# Patient Record
Sex: FEMALE | Race: WHITE | NOT HISPANIC OR LATINO | Employment: STUDENT | URBAN - METROPOLITAN AREA
[De-identification: names, ages, dates, MRNs, and addresses within clinical notes are randomized per-mention and may not be internally consistent; named-entity substitution may affect disease eponyms.]

---

## 2024-08-10 ENCOUNTER — HOSPITAL ENCOUNTER (INPATIENT)
Facility: HOSPITAL | Age: 24
LOS: 1 days | Discharge: HOME | DRG: 392 | End: 2024-08-13
Attending: EMERGENCY MEDICINE | Admitting: SURGERY
Payer: COMMERCIAL

## 2024-08-10 ENCOUNTER — APPOINTMENT (EMERGENCY)
Dept: RADIOLOGY | Facility: HOSPITAL | Age: 24
DRG: 392 | End: 2024-08-10
Payer: COMMERCIAL

## 2024-08-10 DIAGNOSIS — R11.2 NAUSEA AND VOMITING: ICD-10-CM

## 2024-08-10 DIAGNOSIS — K81.9 CHOLECYSTITIS: ICD-10-CM

## 2024-08-10 DIAGNOSIS — R10.9 ABDOMINAL PAIN: Primary | ICD-10-CM

## 2024-08-10 DIAGNOSIS — R74.01 TRANSAMINITIS: ICD-10-CM

## 2024-08-10 LAB
ALBUMIN SERPL BCG-MCNC: 4.6 G/DL (ref 3.5–5)
ALP SERPL-CCNC: 93 U/L (ref 34–104)
ALT SERPL W P-5'-P-CCNC: 237 U/L (ref 7–52)
ANION GAP SERPL CALCULATED.3IONS-SCNC: 9 MMOL/L (ref 4–13)
AST SERPL W P-5'-P-CCNC: 308 U/L (ref 13–39)
B-HCG SERPL-ACNC: <0.6 MIU/ML (ref 0–5)
BASOPHILS # BLD AUTO: 0.02 THOUSANDS/ÂΜL (ref 0–0.1)
BASOPHILS NFR BLD AUTO: 0 % (ref 0–1)
BILIRUB SERPL-MCNC: 0.76 MG/DL (ref 0.2–1)
BILIRUB UR QL STRIP: NEGATIVE
BUN SERPL-MCNC: 22 MG/DL (ref 5–25)
CALCIUM SERPL-MCNC: 9.8 MG/DL (ref 8.4–10.2)
CHLORIDE SERPL-SCNC: 102 MMOL/L (ref 96–108)
CLARITY UR: CLEAR
CO2 SERPL-SCNC: 26 MMOL/L (ref 21–32)
COLOR UR: YELLOW
CREAT SERPL-MCNC: 1.52 MG/DL (ref 0.6–1.3)
EOSINOPHIL # BLD AUTO: 0.07 THOUSAND/ÂΜL (ref 0–0.61)
EOSINOPHIL NFR BLD AUTO: 1 % (ref 0–6)
ERYTHROCYTE [DISTWIDTH] IN BLOOD BY AUTOMATED COUNT: 12.6 % (ref 11.6–15.1)
EXT PREGNANCY TEST URINE: NEGATIVE
EXT. CONTROL: NORMAL
GFR SERPL CREATININE-BSD FRML MDRD: 48 ML/MIN/1.73SQ M
GLUCOSE SERPL-MCNC: 104 MG/DL (ref 65–140)
GLUCOSE UR STRIP-MCNC: NEGATIVE MG/DL
HCT VFR BLD AUTO: 42.9 % (ref 34.8–46.1)
HGB BLD-MCNC: 13.7 G/DL (ref 11.5–15.4)
HGB UR QL STRIP.AUTO: NEGATIVE
IMM GRANULOCYTES # BLD AUTO: 0.05 THOUSAND/UL (ref 0–0.2)
IMM GRANULOCYTES NFR BLD AUTO: 1 % (ref 0–2)
KETONES UR STRIP-MCNC: ABNORMAL MG/DL
LEUKOCYTE ESTERASE UR QL STRIP: NEGATIVE
LIPASE SERPL-CCNC: 28 U/L (ref 11–82)
LYMPHOCYTES # BLD AUTO: 0.81 THOUSANDS/ÂΜL (ref 0.6–4.47)
LYMPHOCYTES NFR BLD AUTO: 9 % (ref 14–44)
MCH RBC QN AUTO: 29.5 PG (ref 26.8–34.3)
MCHC RBC AUTO-ENTMCNC: 31.9 G/DL (ref 31.4–37.4)
MCV RBC AUTO: 92 FL (ref 82–98)
MONOCYTES # BLD AUTO: 0.59 THOUSAND/ÂΜL (ref 0.17–1.22)
MONOCYTES NFR BLD AUTO: 6 % (ref 4–12)
NEUTROPHILS # BLD AUTO: 7.76 THOUSANDS/ÂΜL (ref 1.85–7.62)
NEUTS SEG NFR BLD AUTO: 83 % (ref 43–75)
NITRITE UR QL STRIP: NEGATIVE
NRBC BLD AUTO-RTO: 0 /100 WBCS
PH UR STRIP.AUTO: 5.5 [PH]
PLATELET # BLD AUTO: 238 THOUSANDS/UL (ref 149–390)
PMV BLD AUTO: 9.3 FL (ref 8.9–12.7)
POTASSIUM SERPL-SCNC: 3.8 MMOL/L (ref 3.5–5.3)
PROT SERPL-MCNC: 7.1 G/DL (ref 6.4–8.4)
PROT UR STRIP-MCNC: ABNORMAL MG/DL
RBC # BLD AUTO: 4.65 MILLION/UL (ref 3.81–5.12)
SODIUM SERPL-SCNC: 137 MMOL/L (ref 135–147)
SP GR UR STRIP.AUTO: 1.03 (ref 1–1.03)
UROBILINOGEN UR STRIP-ACNC: <2 MG/DL
WBC # BLD AUTO: 9.3 THOUSAND/UL (ref 4.31–10.16)

## 2024-08-10 PROCEDURE — 99284 EMERGENCY DEPT VISIT MOD MDM: CPT

## 2024-08-10 PROCEDURE — 74177 CT ABD & PELVIS W/CONTRAST: CPT

## 2024-08-10 PROCEDURE — 96375 TX/PRO/DX INJ NEW DRUG ADDON: CPT

## 2024-08-10 PROCEDURE — 99285 EMERGENCY DEPT VISIT HI MDM: CPT | Performed by: EMERGENCY MEDICINE

## 2024-08-10 PROCEDURE — 84702 CHORIONIC GONADOTROPIN TEST: CPT

## 2024-08-10 PROCEDURE — 83690 ASSAY OF LIPASE: CPT

## 2024-08-10 PROCEDURE — 80053 COMPREHEN METABOLIC PANEL: CPT

## 2024-08-10 PROCEDURE — 85652 RBC SED RATE AUTOMATED: CPT | Performed by: SURGERY

## 2024-08-10 PROCEDURE — 85025 COMPLETE CBC W/AUTO DIFF WBC: CPT

## 2024-08-10 PROCEDURE — 81025 URINE PREGNANCY TEST: CPT

## 2024-08-10 PROCEDURE — 36415 COLL VENOUS BLD VENIPUNCTURE: CPT

## 2024-08-10 PROCEDURE — 81001 URINALYSIS AUTO W/SCOPE: CPT

## 2024-08-10 RX ORDER — KETOROLAC TROMETHAMINE 30 MG/ML
15 INJECTION, SOLUTION INTRAMUSCULAR; INTRAVENOUS ONCE
Status: COMPLETED | OUTPATIENT
Start: 2024-08-10 | End: 2024-08-10

## 2024-08-10 RX ORDER — ONDANSETRON 4 MG/1
4 TABLET, ORALLY DISINTEGRATING ORAL ONCE
Status: COMPLETED | OUTPATIENT
Start: 2024-08-10 | End: 2024-08-10

## 2024-08-10 RX ORDER — ACETAMINOPHEN 325 MG/1
650 TABLET ORAL ONCE
Status: COMPLETED | OUTPATIENT
Start: 2024-08-10 | End: 2024-08-10

## 2024-08-10 RX ORDER — SODIUM CHLORIDE, SODIUM GLUCONATE, SODIUM ACETATE, POTASSIUM CHLORIDE, MAGNESIUM CHLORIDE, SODIUM PHOSPHATE, DIBASIC, AND POTASSIUM PHOSPHATE .53; .5; .37; .037; .03; .012; .00082 G/100ML; G/100ML; G/100ML; G/100ML; G/100ML; G/100ML; G/100ML
1000 INJECTION, SOLUTION INTRAVENOUS ONCE
Status: COMPLETED | OUTPATIENT
Start: 2024-08-11 | End: 2024-08-11

## 2024-08-10 RX ADMIN — KETOROLAC TROMETHAMINE 15 MG: 30 INJECTION, SOLUTION INTRAMUSCULAR; INTRAVENOUS at 22:51

## 2024-08-10 RX ADMIN — IOHEXOL 100 ML: 350 INJECTION, SOLUTION INTRAVENOUS at 23:33

## 2024-08-10 RX ADMIN — ONDANSETRON 4 MG: 4 TABLET, ORALLY DISINTEGRATING ORAL at 22:00

## 2024-08-10 RX ADMIN — ACETAMINOPHEN 650 MG: 325 TABLET ORAL at 22:51

## 2024-08-10 NOTE — LETTER
[facility] MED SURG 4  [address]  [place] [address]  Dept: [phone]    August 13, 2024     Patient: Jennifer Henry   YOB: 2000   Date of Visit: 8/10/2024       To Whom it May Concern:    Jennifer Henry is under my professional care. She was seen in the hospital from 8/10/2024 to 08/13/24. She may return to school and full athletic activities on 8/13/24 without limitations.    If you have any questions or concerns, please don't hesitate to call.         Sincerely,          Blanca Nathan MD    [facility] General Surgery [place]  Phone: [phone] [address] [place] [address] [id number]

## 2024-08-11 ENCOUNTER — APPOINTMENT (EMERGENCY)
Dept: RADIOLOGY | Facility: HOSPITAL | Age: 24
DRG: 392 | End: 2024-08-11
Payer: COMMERCIAL

## 2024-08-11 LAB
ALBUMIN SERPL BCG-MCNC: 3.9 G/DL (ref 3.5–5)
ALP SERPL-CCNC: 106 U/L (ref 34–104)
ALT SERPL W P-5'-P-CCNC: 989 U/L (ref 7–52)
ANA SER QL IA: NEGATIVE
ANION GAP SERPL CALCULATED.3IONS-SCNC: 7 MMOL/L (ref 4–13)
AST SERPL W P-5'-P-CCNC: 962 U/L (ref 13–39)
BACTERIA UR QL AUTO: ABNORMAL /HPF
BILIRUB SERPL-MCNC: 1.47 MG/DL (ref 0.2–1)
BUN SERPL-MCNC: 18 MG/DL (ref 5–25)
CALCIUM SERPL-MCNC: 9.2 MG/DL (ref 8.4–10.2)
CHLORIDE SERPL-SCNC: 104 MMOL/L (ref 96–108)
CO2 SERPL-SCNC: 26 MMOL/L (ref 21–32)
CREAT SERPL-MCNC: 1.18 MG/DL (ref 0.6–1.3)
CRP SERPL QL: 9.3 MG/L
ERYTHROCYTE [SEDIMENTATION RATE] IN BLOOD: 4 MM/HOUR (ref 0–19)
GFR SERPL CREATININE-BSD FRML MDRD: 66 ML/MIN/1.73SQ M
GLUCOSE SERPL-MCNC: 92 MG/DL (ref 65–140)
MUCOUS THREADS UR QL AUTO: ABNORMAL
NON-SQ EPI CELLS URNS QL MICRO: ABNORMAL /HPF
POTASSIUM SERPL-SCNC: 4.4 MMOL/L (ref 3.5–5.3)
PROT SERPL-MCNC: 6 G/DL (ref 6.4–8.4)
RBC #/AREA URNS AUTO: ABNORMAL /HPF
SODIUM SERPL-SCNC: 137 MMOL/L (ref 135–147)
WBC #/AREA URNS AUTO: ABNORMAL /HPF

## 2024-08-11 PROCEDURE — 99205 OFFICE O/P NEW HI 60 MIN: CPT | Performed by: SURGERY

## 2024-08-11 PROCEDURE — 86038 ANTINUCLEAR ANTIBODIES: CPT

## 2024-08-11 PROCEDURE — 96372 THER/PROPH/DIAG INJ SC/IM: CPT

## 2024-08-11 PROCEDURE — 86140 C-REACTIVE PROTEIN: CPT

## 2024-08-11 PROCEDURE — 96366 THER/PROPH/DIAG IV INF ADDON: CPT

## 2024-08-11 PROCEDURE — 36415 COLL VENOUS BLD VENIPUNCTURE: CPT

## 2024-08-11 PROCEDURE — 80053 COMPREHEN METABOLIC PANEL: CPT

## 2024-08-11 PROCEDURE — NC001 PR NO CHARGE: Performed by: SURGERY

## 2024-08-11 PROCEDURE — 96365 THER/PROPH/DIAG IV INF INIT: CPT

## 2024-08-11 PROCEDURE — 76705 ECHO EXAM OF ABDOMEN: CPT

## 2024-08-11 RX ORDER — ACETAMINOPHEN 325 MG/1
650 TABLET ORAL EVERY 6 HOURS PRN
Status: DISCONTINUED | OUTPATIENT
Start: 2024-08-11 | End: 2024-08-13 | Stop reason: HOSPADM

## 2024-08-11 RX ORDER — TRAMADOL HYDROCHLORIDE 50 MG/1
50 TABLET ORAL ONCE AS NEEDED
Status: COMPLETED | OUTPATIENT
Start: 2024-08-11 | End: 2024-08-11

## 2024-08-11 RX ORDER — HEPARIN SODIUM 5000 [USP'U]/ML
5000 INJECTION, SOLUTION INTRAVENOUS; SUBCUTANEOUS EVERY 8 HOURS SCHEDULED
Status: DISCONTINUED | OUTPATIENT
Start: 2024-08-11 | End: 2024-08-11

## 2024-08-11 RX ORDER — ONDANSETRON 2 MG/ML
4 INJECTION INTRAMUSCULAR; INTRAVENOUS EVERY 6 HOURS PRN
Status: DISCONTINUED | OUTPATIENT
Start: 2024-08-11 | End: 2024-08-13 | Stop reason: HOSPADM

## 2024-08-11 RX ORDER — SODIUM CHLORIDE, SODIUM GLUCONATE, SODIUM ACETATE, POTASSIUM CHLORIDE, MAGNESIUM CHLORIDE, SODIUM PHOSPHATE, DIBASIC, AND POTASSIUM PHOSPHATE .53; .5; .37; .037; .03; .012; .00082 G/100ML; G/100ML; G/100ML; G/100ML; G/100ML; G/100ML; G/100ML
50 INJECTION, SOLUTION INTRAVENOUS CONTINUOUS
Status: DISCONTINUED | OUTPATIENT
Start: 2024-08-11 | End: 2024-08-12

## 2024-08-11 RX ADMIN — TRAMADOL HYDROCHLORIDE 50 MG: 50 TABLET, COATED ORAL at 01:42

## 2024-08-11 RX ADMIN — SODIUM CHLORIDE, SODIUM GLUCONATE, SODIUM ACETATE, POTASSIUM CHLORIDE, MAGNESIUM CHLORIDE, SODIUM PHOSPHATE, DIBASIC, AND POTASSIUM PHOSPHATE 1000 ML: .53; .5; .37; .037; .03; .012; .00082 INJECTION, SOLUTION INTRAVENOUS at 00:12

## 2024-08-11 RX ADMIN — HEPARIN SODIUM 5000 UNITS: 5000 INJECTION INTRAVENOUS; SUBCUTANEOUS at 13:43

## 2024-08-11 RX ADMIN — HEPARIN SODIUM 5000 UNITS: 5000 INJECTION INTRAVENOUS; SUBCUTANEOUS at 06:10

## 2024-08-11 RX ADMIN — SODIUM CHLORIDE, SODIUM GLUCONATE, SODIUM ACETATE, POTASSIUM CHLORIDE AND MAGNESIUM CHLORIDE 50 ML/HR: 526; 502; 368; 37; 30 INJECTION, SOLUTION INTRAVENOUS at 12:43

## 2024-08-11 NOTE — ED ATTENDING ATTESTATION
8/10/2024  I, Prince Em DO, saw and evaluated the patient. I have discussed the patient with the resident/non-physician practitioner and agree with the resident's/non-physician practitioner's findings, Plan of Care, and MDM as documented in the resident's/non-physician practitioner's note, except where noted. All available labs and Radiology studies were reviewed.  I was present for key portions of any procedure(s) performed by the resident/non-physician practitioner and I was immediately available to provide assistance.       At this point I agree with the current assessment done in the Emergency Department.  I have conducted an independent evaluation of this patient a history and physical is as follows:    Patient is a healthy 23-year-old female, accompanied by her mother.  She is a [employer] , about 5 PM on the bus ride home from a weight tournament she began having some nausea, crampy right-sided abdominal pain, got off the bus, had an episode of nonbloody, nonbilious emesis, and nonbloody, non-mucousy diarrhea.  She says that she has no anorexia, no fever, no chills, no dysuria or hematuria, no vaginal bleeding or discharge.  With her mother out of the room, the patient says she is sexually active but with a long-distance partner, last intercourse was 3 months ago.  Nothing inserted inside the vagina recently, no toys, no intercourse.  Patient has a history of a ruptured ovarian cyst about 6 years ago, did not require any surgery, says this does not really feel much like that.  Patient's mother indicates that the patient is otherwise been acting well.  Patient says that almost everyone else on the team ate the same food she did, no one else has similar symptoms, she has no camping, no backpacking, no drinking from streams or unusual exposures.  No recent antibiotics.    General:  Patient is well-appearing  Head:  Atraumatic  Eyes:  Conjunctiva pink  ENT:  Mucous membranes are  moist  Neck:  Supple  Cardiac:  S1-S2, without murmurs  Lungs:  Clear to auscultation bilaterally  Abdomen: Patient has some slight right-sided abdominal tenderness more in the middle part of the abdomen, no true lower abdominal tenderness, no significant upper abdominal tenderness.  No Hill sign.  No Rovsing sign.  No tympany, no rigidity, no guarding, no CVA tenderness.  Extremities:  Normal range of motion  Neurologic:  Awake, fluent speech, normal comprehension, AAOx3  Skin:  Pink warm and dry  Psychiatric:  Alert, pleasant, cooperative      ED Course     Labs Reviewed   CBC AND DIFFERENTIAL - Abnormal       Result Value Ref Range Status    WBC 9.30  4.31 - 10.16 Thousand/uL Final    RBC 4.65  3.81 - 5.12 Million/uL Final    Hemoglobin 13.7  11.5 - 15.4 g/dL Final    Hematocrit 42.9  34.8 - 46.1 % Final    MCV 92  82 - 98 fL Final    MCH 29.5  26.8 - 34.3 pg Final    MCHC 31.9  31.4 - 37.4 g/dL Final    RDW 12.6  11.6 - 15.1 % Final    MPV 9.3  8.9 - 12.7 fL Final    Platelets 238  149 - 390 Thousands/uL Final    nRBC 0  /100 WBCs Final    Segmented % 83 (*) 43 - 75 % Final    Immature Grans % 1  0 - 2 % Final    Lymphocytes % 9 (*) 14 - 44 % Final    Monocytes % 6  4 - 12 % Final    Eosinophils Relative 1  0 - 6 % Final    Basophils Relative 0  0 - 1 % Final    Absolute Neutrophils 7.76 (*) 1.85 - 7.62 Thousands/µL Final    Absolute Immature Grans 0.05  0.00 - 0.20 Thousand/uL Final    Absolute Lymphocytes 0.81  0.60 - 4.47 Thousands/µL Final    Absolute Monocytes 0.59  0.17 - 1.22 Thousand/µL Final    Eosinophils Absolute 0.07  0.00 - 0.61 Thousand/µL Final    Basophils Absolute 0.02  0.00 - 0.10 Thousands/µL Final   COMPREHENSIVE METABOLIC PANEL - Abnormal    Sodium 137  135 - 147 mmol/L Final    Potassium 3.8  3.5 - 5.3 mmol/L Final    Chloride 102  96 - 108 mmol/L Final    CO2 26  21 - 32 mmol/L Final    ANION GAP 9  4 - 13 mmol/L Final    BUN 22  5 - 25 mg/dL Final    Creatinine 1.52 (*) 0.60 - 1.30  mg/dL Final    Comment: Standardized to IDMS reference method    Glucose 104  65 - 140 mg/dL Final    Comment: If the patient is fasting, the ADA then defines impaired fasting glucose as > 100 mg/dL and diabetes as > or equal to 123 mg/dL.    Calcium 9.8  8.4 - 10.2 mg/dL Final     (*) 13 - 39 U/L Final     (*) 7 - 52 U/L Final    Comment: Specimen collection should occur prior to Sulfasalazine administration due to the potential for falsely depressed results.     Alkaline Phosphatase 93  34 - 104 U/L Final    Total Protein 7.1  6.4 - 8.4 g/dL Final    Albumin 4.6  3.5 - 5.0 g/dL Final    Total Bilirubin 0.76  0.20 - 1.00 mg/dL Final    Comment: Use of this assay is not recommended for patients undergoing treatment with eltrombopag due to the potential for falsely elevated results.  N-acetyl-p-benzoquinone imine (metabolite of Acetaminophen) will generate erroneously low results in samples for patients that have taken an overdose of Acetaminophen.    eGFR 48  ml/min/1.73sq m Final    Narrative:     National Kidney Disease Foundation guidelines for Chronic Kidney Disease (CKD):     Stage 1 with normal or high GFR (GFR > 90 mL/min/1.73 square meters)    Stage 2 Mild CKD (GFR = 60-89 mL/min/1.73 square meters)    Stage 3A Moderate CKD (GFR = 45-59 mL/min/1.73 square meters)    Stage 3B Moderate CKD (GFR = 30-44 mL/min/1.73 square meters)    Stage 4 Severe CKD (GFR = 15-29 mL/min/1.73 square meters)    Stage 5 End Stage CKD (GFR <15 mL/min/1.73 square meters)  Note: GFR calculation is accurate only with a steady state creatinine   UA W REFLEX TO MICROSCOPIC WITH REFLEX TO CULTURE - Abnormal    Color, UA Yellow   Final    Clarity, UA Clear   Final    Specific Gravity, UA 1.026  1.003 - 1.030 Final    pH, UA 5.5  4.5, 5.0, 5.5, 6.0, 6.5, 7.0, 7.5, 8.0 Final    Leukocytes, UA Negative  Negative Final    Nitrite, UA Negative  Negative Final    Protein, UA 30 (1+) (*) Negative mg/dl Final    Glucose, UA  Negative  Negative mg/dl Final    Ketones, UA Trace (*) Negative mg/dl Final    Urobilinogen, UA <2.0  <2.0 mg/dl mg/dl Final    Bilirubin, UA Negative  Negative Final    Occult Blood, UA Negative  Negative Final   URINE MICROSCOPIC - Abnormal    RBC, UA None Seen  None Seen, 1-2 /hpf Final    WBC, UA None Seen  None Seen, 1-2 /hpf Final    Epithelial Cells Occasional  None Seen, Occasional /hpf Final    Bacteria, UA Occasional  None Seen, Occasional /hpf Final    MUCUS THREADS Occasional (*) None Seen Final   LIPASE - Normal    Lipase 28  11 - 82 u/L Final   HCG, QUANTITATIVE - Normal    HCG, Quant <0.6  0 - 5 mIU/mL Final    Narrative:      Expected Ranges:    HCG results between 5.0 and 25.0 mIU/mL may be indicative of early pregnancy but should be interpreted in light of the total clinical presentation.    HCG can rise to detectable levels in apolinar and post menopausal women (0-11.6 mIU/mL).     Approximate               Approximate HCG  Gestation age          Concentration ( mIU/mL)  _____________          ______________________   Weeks                      HCG values  0.2-1                       5-50  1-2                           2-3                         100-5000  3-4                         500-46816  4-5                         1000-46473  5-6                         [id number]  6-8                         [id number]  8-12                        [id number]     POCT PREGNANCY, URINE - Normal    EXT Preg Test, Ur Negative   Final    Control Valid   Final   GABO SCREEN W/ REFLEX TO TITER/PATTERN   C-REACTIVE PROTEIN     CT abdomen pelvis w contrast   Final Result      No acute findings in the abdomen or pelvis.      Mild nonspecific periportal edema as well as mild diffuse gallbladder wall thickening with no calcified gallstones. Correlation with laboratory studies is recommended.      The appendix is not clearly visualized on this study. If symptoms persist, a repeat CT abdomen/pelvis after the  administration of oral contrast could be performed for further evaluation.      Workstation performed: [id number]         US right upper quadrant    (Results Pending)         Patient presents with right-sided abdominal pain, nausea, vomiting and diarrhea.  May represent a viral gastroenteritis.  Also may represent acute appendicitis.  Acute ovarian torsion considered much less likely.  Laboratory studies showed no evidence of ectopic pregnancy.    CT abdomen pelvis interpreted by me shows nonspecific gallbladder wall thickening, no clearly visualized appendix.  Case discussed with general surgery, patient seen and eval by them, their recommendations are for admission, obtaining formal right upper quadrant ultrasound  Patient admitted to their service.    The patient was evaluated for sepsis in the emergency department. After that assessment, at the time of admission, no sepsis, severe sepsis, or septic shock was found.    DIAGNOSIS:  Acute right-sided abdominal pain of unclear etiology, acute nausea and vomiting and diarrhea    MEDICAL DECISION MAKING CODING    COLLECTION AND INTERPRETATION OF DATA  I reviewed prior external notes, including November 2021 family medicine office visit    I ordered each unique test  Tests reviewed personally by me:  Labs: See above  Imaging: I independently interpreted the CT abdomen pelvis as noted above.            Critical Care Time  Procedures

## 2024-08-11 NOTE — ED NOTES
Pt's family asking for updates on care plan, Dr. Bravo notified and will come down to speak to them.     Darlyn Richter RN  08/11/24 [id number]       Darlyn Richter RN  08/11/24 [id number]

## 2024-08-11 NOTE — PROGRESS NOTES
Progress Note - General Surgery   Jennifer Henry 23 y.o. female MRN: [id number]  Unit/Bed#: ED 02 Encounter: [id number]    Assessment:  23 y.o. female with no PMHx presents to ED with nausea, vomiting, and abdominal pain.     CT imaging with diffuse GB wall thickening.  RUQ US- Nonspecific gallbladder wall thickening and pericholecystic edema without gallstones, sludge or significant gallbladder distention     AST/ALT: 962/989 (308/237)    (93)  Tbili 1.47 (0.76)    Plan:  -likely not a gallbladder etiology  -concern for hepatitis  -GI consult: appreciate recs  -prn analgesia    Subjective/Objective     Subjective: NAEON. Reports some mild abdominal pain. No longer endorses nausea, no episodes of emesis overnight. No acute complaints or concerns.    Objective:     Blood pressure 105/70, pulse 60, temperature 98.6 °F (37 °C), temperature source Tympanic, resp. rate 16, SpO2 99%.,There is no height or weight on file to calculate BMI.    No intake or output data in the 24 hours ending 08/11/24 0250    Invasive Devices       Peripheral Intravenous Line  Duration             Peripheral IV 08/10/24 Left Antecubital <1 day                    Physical Exam:   General: NAD  HENT: NCAT MMM  Neck: supple, no JVD  CV: nl rate  Lungs: nl wob. No resp distress  ABD: Soft, mildly tender in epigastrium and RUQ, nondistended  Extrem: No CCE  Neuro: AAOx3      Lab, Imaging and other studies:I have personally reviewed pertinent lab results.    VTE Pharmacologic Prophylaxis: Heparin  VTE Mechanical Prophylaxis: sequential compression device

## 2024-08-11 NOTE — H&P
H&P- Acute Care Surgery  : Red Surgery Resident role   Jennifer Henry 23 y.o. female MRN: [id number]  Unit/Bed#: ED 07 Encounter: [id number]        Assessment:  23 y.o. female with no PMHx presents to ED with nausea, vomiting, and abdominal pain.    CT imaging demonstrative of diffuse GB wall thickening.  AST/ALT: 308/237  Cr 1.52    Plan:  -observation status  -RUQ US   -follow up GABO, CRP, LFTs  -prn analgesia and antiemetics    HPI:  Jennifer Henry is a 23 y.o. female with no PMHx presents to ED with nausea, vomiting and abdominal pain which started this past evening. Patient reports numerous episodes of vomiting and first thought it was food poisoning. Reports has not had this kind of abdominal pain before. Also endorses chills. Denies chest pain, SOB, radiating pain. Imaging remarkable for GB wall thickening and elevated LFTs.    Physical Exam  Constitutional:       General: She is not in acute distress.     Appearance: Normal appearance.   Eyes:      Conjunctiva/sclera: Conjunctivae normal.   Cardiovascular:      Rate and Rhythm: Normal rate and regular rhythm.   Pulmonary:      Effort: Pulmonary effort is normal. No respiratory distress.   Abdominal:      General: Abdomen is flat.      Palpations: Abdomen is soft.      Tenderness: There is abdominal tenderness.      Comments: Epigastric and RUQ tenderness   Skin:     General: Skin is warm and dry.   Neurological:      Mental Status: She is alert and oriented to person, place, and time.              Review of Systems   Constitutional:  Positive for chills.   Respiratory:  Negative for shortness of breath.    Gastrointestinal:  Positive for abdominal pain, nausea and vomiting.          Objective       No intake or output data in the 24 hours ending 08/11/24 0120    First Vitals:   Blood Pressure: 108/90 (08/10/24 2132)  Pulse: 86 (08/10/24 2132)  Temperature: 98.6 °F (37 °C) (08/10/24 2132)  Temp Source: Tympanic (08/10/24  2132)  Respirations: 17 (08/10/24 2132)  SpO2: 99 % (08/10/24 2132)    Current Vitals:   Blood Pressure: 108/90 (08/10/24 2132)  Pulse: 86 (08/10/24 2132)  Temperature: 98.6 °F (37 °C) (08/10/24 2132)  Temp Source: Tympanic (08/10/24 2132)  Respirations: 17 (08/10/24 2132)  SpO2: 99 % (08/10/24 2132)    Invasive Devices       Peripheral Intravenous Line  Duration             Peripheral IV 08/10/24 Left Antecubital <1 day                    Imaging: I have personally reviewed pertinent reports.      CT abdomen pelvis w contrast    Result Date: 8/11/2024  Impression: No acute findings in the abdomen or pelvis. Mild nonspecific periportal edema as well as mild diffuse gallbladder wall thickening with no calcified gallstones. Correlation with laboratory studies is recommended. The appendix is not clearly visualized on this study. If symptoms persist, a repeat CT abdomen/pelvis after the administration of oral contrast could be performed for further evaluation. Workstation performed: [id number]       EKG, Pathology, and Other Studies: I have personally reviewed pertinent reports.    VTE Pharmacologic Prophylaxis: Heparin  VTE Mechanical Prophylaxis: sequential compression device    Historical Information   History reviewed. No pertinent past medical history.  History reviewed. No pertinent surgical history.  Social History   Social History     Substance and Sexual Activity   Alcohol Use None     Social History     Substance and Sexual Activity   Drug Use Not on file     Social History     Tobacco Use   Smoking Status Never   Smokeless Tobacco Never     History reviewed. No pertinent family history.    Meds/Allergies   all current active meds have been reviewed, current meds:   Current Facility-Administered Medications   Medication Dose Route Frequency    acetaminophen (TYLENOL) tablet 650 mg  650 mg Oral Q6H PRN    heparin (porcine) subcutaneous injection 5,000 Units  5,000 Units Subcutaneous Q8H JAMIR    ondansetron  (ZOFRAN) injection 4 mg  4 mg Intravenous Q6H PRN    and PTA meds:   None     No Known Allergies    Lab Results: I have personally reviewed pertinent lab results.  , CBC:   Lab Results   Component Value Date    WBC 9.30 08/10/2024    HGB 13.7 08/10/2024    HCT 42.9 08/10/2024    MCV 92 08/10/2024     08/10/2024    RBC 4.65 08/10/2024    MCH 29.5 08/10/2024    MCHC 31.9 08/10/2024    RDW 12.6 08/10/2024    MPV 9.3 08/10/2024    NRBC 0 08/10/2024   , CMP:   Lab Results   Component Value Date    SODIUM 137 08/10/2024    K 3.8 08/10/2024     08/10/2024    CO2 26 08/10/2024    BUN 22 08/10/2024    CREATININE 1.52 (H) 08/10/2024    CALCIUM 9.8 08/10/2024     (H) 08/10/2024     (H) 08/10/2024    ALKPHOS 93 08/10/2024    EGFR 48 08/10/2024       Counseling / Coordination of Care  Total floor / unit time spent today 25 minutes.  Greater than 50% of total time was spent with the patient and / or family counseling and / or coordination of care.    Consults    Rito Manzo MD  General Surgery   08/11/24

## 2024-08-11 NOTE — PLAN OF CARE
Problem: PAIN - ADULT  Goal: Verbalizes/displays adequate comfort level or baseline comfort level  Description: Interventions:  - Encourage patient to monitor pain and request assistance  - Assess pain using appropriate pain scale  - Administer analgesics based on type and severity of pain and evaluate response  - Implement non-pharmacological measures as appropriate and evaluate response  - Consider cultural and social influences on pain and pain management  - Notify physician/advanced practitioner if interventions unsuccessful or patient reports new pain  Outcome: Progressing     Problem: INFECTION - ADULT  Goal: Absence or prevention of progression during hospitalization  Description: INTERVENTIONS:  - Assess and monitor for signs and symptoms of infection  - Monitor lab/diagnostic results  - Monitor all insertion sites, i.e. indwelling lines, tubes, and drains  - Monitor endotracheal if appropriate and nasal secretions for changes in amount and color  - [place] appropriate cooling/warming therapies per order  - Administer medications as ordered  - Instruct and encourage patient and family to use good hand hygiene technique  - Identify and instruct in appropriate isolation precautions for identified infection/condition  Outcome: Progressing  Goal: Absence of fever/infection during neutropenic period  Description: INTERVENTIONS:  - Monitor WBC    Outcome: Progressing     Problem: SAFETY ADULT  Goal: Patient will remain free of falls  Description: INTERVENTIONS:  - Educate patient/family on patient safety including physical limitations  - Instruct patient to call for assistance with activity   - Consult OT/PT to assist with strengthening/mobility   - Keep Call bell within reach  - Keep bed low and locked with side rails adjusted as appropriate  - Keep care items and personal belongings within reach  - Initiate and maintain comfort rounds  - Make Fall Risk Sign visible to staff  - Obtain necessary fall risk  management equipment  - Apply yellow socks and bracelet for high fall risk patients  - Consider moving patient to room near nurses station  Outcome: Progressing     Problem: DISCHARGE PLANNING  Goal: Discharge to home or other facility with appropriate resources  Description: INTERVENTIONS:  - Identify barriers to discharge w/patient and caregiver  - Arrange for needed discharge resources and transportation as appropriate  - Identify discharge learning needs (meds, wound care, etc.)  - Arrange for interpretive services to assist at discharge as needed  - Refer to Case Management Department for coordinating discharge planning if the patient needs post-hospital services based on physician/advanced practitioner order or complex needs related to functional status, cognitive ability, or social support system  Outcome: Progressing     Problem: Knowledge Deficit  Goal: Patient/family/caregiver demonstrates understanding of disease process, treatment plan, medications, and discharge instructions  Description: Complete learning assessment and assess knowledge base.  Interventions:  - Provide teaching at level of understanding  - Provide teaching via preferred learning methods  Outcome: Progressing

## 2024-08-11 NOTE — ED PROVIDER NOTES
"History  Chief Complaint   Patient presents with    Abdominal Pain     R sided. Started today after a soccer game, no injury, no sick contacts, \"we all ate the same thing and I'm the only one who's sick\". NVD, unable to keep liquids down     HPI    Patient is a 23 y.o. female with no significant past medical history who presents to the ED via private vehicle for evaluation of abdominal pain, N/V/D.  Patient is a [employer]  and about 5 PM tonight she began to develop right-sided abdominal pain.  Patient states the pain has been constant, sharp in nature.  No exacerbating or alleviating factors.  Patient also endorses nausea, multiple episodes of nonbloody nonbilious emesis as well as 2 episodes of nonbloody nonmucousy diarrhea.  Patient denies any recent illness.  Denies any fever, chills, cough, congestion, chest pain, shortness of breath, dysuria, hematuria, vaginal bleeding, abnormal vaginal discharge.  Patient denies any previous abdominal surgeries.  Denies any sick contacts.  Patient states almost everyone else on the soccer team eating food as she did and no one else has similar symptoms.  Denies any recent travel, backpacking or drinking from fresh water streams. Patient states she fell a few times at the soccer game today but did not sustain any specific abdominal injuries.  Patient reports history of ruptured ovarian cyst about 5 years ago.  IUD in place x 1 year.  Denies any chances that she could be pregnant.    ED Course as of 08/11/24 0743   Sat Aug 10, 2024   2148 Blood Pressure: 108/90   2148 Temperature: 98.6 °F (37 °C)   2148 Temp Source: Tympanic   2148 Pulse: 86   2149 Respirations: 17   2149 SpO2: 99 %   2209 ASSESSMENT: Patient is a 23 y.o. female who presents with right sided abdominal pain, N/V/D.   DDX includes but not limited to: pregnancy, ovarian pathology (cyst vs cyst rupture), appendicitis, kidney stone, gastroenteritis.   PLAN: CBC, CMP, lipase, urine pregnancy, " UA, hcg quant, CT AP. Treated with zofran, tylenol, toradol.     2300 PREGNANCY TEST URINE: Negative  negative   2303 WBC: 9.30  No leukocytosis    2304 Segmented %(!): 83   2304 Hemoglobin: 13.7  wnl   2304 Platelet Count: 238  wnl   Sun Aug 11, 2024   0000 Creatinine(!): 1.52  BARBARA. 1L isolyte ordered    0000 AST(!): 308   0000 ALT(!): 237   0000 Leukocytes, UA: Negative  Doubt UTI   0000 Nitrite, UA: Negative   0000 LIPASE: 28  Doubt pancreatitis    0014 CT abdomen pelvis w contrast  No acute findings in the abdomen or pelvis.     Mild nonspecific periportal edema as well as mild diffuse gallbladder wall thickening with no calcified gallstones. Correlation with laboratory studies is recommended.     The appendix is not clearly visualized on this study. If symptoms persist, a repeat CT abdomen/pelvis after the administration of oral contrast could be performed for further evaluation.     0014 Pain improved after toradol    0028 Red surgery consulted    0120 Admitted to red surgery for poss cholecystitis          None       History reviewed. No pertinent past medical history.    History reviewed. No pertinent surgical history.    History reviewed. No pertinent family history.  I have reviewed and agree with the history as documented.    E-Cigarette/Vaping     E-Cigarette/Vaping Substances     Social History     Tobacco Use    Smoking status: Never    Smokeless tobacco: Never        Review of Systems   Gastrointestinal:  Positive for abdominal pain, diarrhea, nausea and vomiting.       Physical Exam  ED Triage Vitals   Temperature Pulse Respirations Blood Pressure SpO2   08/10/24 2132 08/10/24 2132 08/10/24 2132 08/10/24 2132 08/10/24 2132   98.6 °F (37 °C) 86 17 108/90 99 %      Temp Source Heart Rate Source Patient Position - Orthostatic VS BP Location FiO2 (%)   08/10/24 2132 08/10/24 2132 08/11/24 0125 08/11/24 0125 --   Tympanic Monitor Lying Left arm       Pain Score       08/10/24 2132       8              Orthostatic Vital Signs  Vitals:    08/10/24 2132 08/11/24 0125 08/11/24 0613   BP: 108/90 105/70 103/55   Pulse: 86 60 60   Patient Position - Orthostatic VS:  Lying Lying       Physical Exam  Vitals and nursing note reviewed.   Constitutional:       General: She is not in acute distress.     Appearance: Normal appearance. She is well-developed. She is not ill-appearing, toxic-appearing or diaphoretic.   HENT:      Head: Normocephalic and atraumatic.      Mouth/Throat:      Mouth: Mucous membranes are moist.   Eyes:      Conjunctiva/sclera: Conjunctivae normal.   Cardiovascular:      Rate and Rhythm: Normal rate and regular rhythm.      Pulses: Normal pulses.      Heart sounds: Normal heart sounds. No murmur heard.  Pulmonary:      Effort: Pulmonary effort is normal. No respiratory distress.      Breath sounds: Normal breath sounds. No stridor. No wheezing, rhonchi or rales.   Abdominal:      General: There is no distension.      Palpations: Abdomen is soft.      Tenderness: There is abdominal tenderness. There is no right CVA tenderness, left CVA tenderness, guarding or rebound.      Comments: TTP right mid abdomen   Musculoskeletal:         General: No swelling. Normal range of motion.      Cervical back: Normal range of motion and neck supple.   Skin:     General: Skin is warm and dry.      Capillary Refill: Capillary refill takes less than 2 seconds.   Neurological:      General: No focal deficit present.      Mental Status: She is alert and oriented to person, place, and time.   Psychiatric:         Mood and Affect: Mood normal.         ED Medications  Medications   heparin (porcine) subcutaneous injection 5,000 Units (5,000 Units Subcutaneous Given 8/11/24 0610)   acetaminophen (TYLENOL) tablet 650 mg (has no administration in time range)   ondansetron (ZOFRAN) injection 4 mg (has no administration in time range)   ondansetron (ZOFRAN-ODT) dispersible tablet 4 mg (4 mg Oral Given 8/10/24 2200)    ketorolac (TORADOL) injection 15 mg (15 mg Intravenous Given 8/10/24 2251)   acetaminophen (TYLENOL) tablet 650 mg (650 mg Oral Given 8/10/24 2251)   iohexol (OMNIPAQUE) 350 MG/ML injection (MULTI-DOSE) 100 mL (100 mL Intravenous Given 8/10/24 2333)   multi-electrolyte (ISOLYTE-S PH 7.4) bolus 1,000 mL (0 mL Intravenous Stopped 8/11/24 0125)   traMADol (ULTRAM) tablet 50 mg (50 mg Oral Given 8/11/24 0142)       Diagnostic Studies  Results Reviewed       Procedure Component Value Units Date/Time    Comprehensive metabolic panel [[id number]]  (Abnormal) Collected: 08/11/24 0610    Lab Status: Final result Specimen: Blood from Arm, Right Updated: 08/11/24 0726     Sodium 137 mmol/L      Potassium 4.4 mmol/L      Chloride 104 mmol/L      CO2 26 mmol/L      ANION GAP 7 mmol/L      BUN 18 mg/dL      Creatinine 1.18 mg/dL      Glucose 92 mg/dL      Calcium 9.2 mg/dL       U/L       U/L      Alkaline Phosphatase 106 U/L      Total Protein 6.0 g/dL      Albumin 3.9 g/dL      Total Bilirubin 1.47 mg/dL      eGFR 66 ml/min/1.73sq m     Narrative:      National Kidney Disease Foundation guidelines for Chronic Kidney Disease (CKD):     Stage 1 with normal or high GFR (GFR > 90 mL/min/1.73 square meters)    Stage 2 Mild CKD (GFR = 60-89 mL/min/1.73 square meters)    Stage 3A Moderate CKD (GFR = 45-59 mL/min/1.73 square meters)    Stage 3B Moderate CKD (GFR = 30-44 mL/min/1.73 square meters)    Stage 4 Severe CKD (GFR = 15-29 mL/min/1.73 square meters)    Stage 5 End Stage CKD (GFR <15 mL/min/1.73 square meters)  Note: GFR calculation is accurate only with a steady state creatinine    Sedimentation rate, automated [[id number]] Collected: 08/10/24 2249    Lab Status: In process Specimen: Blood from Arm, Left Updated: 08/11/24 0705    C-reactive protein [[id number]]  (Abnormal) Collected: 08/11/24 0126    Lab Status: Final result Specimen: Blood from Arm, Left Updated: 08/11/24 0154     CRP 9.3 mg/L     GABO Screen w/  Reflex to Titer/Pattern [[id number]] Collected: 08/11/24 0126    Lab Status: In process Specimen: Blood from Arm, Left Updated: 08/11/24 0130    Urine Microscopic [[id number]]  (Abnormal) Collected: 08/10/24 2245    Lab Status: Final result Specimen: Urine, Clean Catch Updated: 08/11/24 0001     RBC, UA None Seen /hpf      WBC, UA None Seen /hpf      Epithelial Cells Occasional /hpf      Bacteria, UA Occasional /hpf      MUCUS THREADS Occasional    UA w Reflex to Microscopic w Reflex to Culture [[id number]]  (Abnormal) Collected: 08/10/24 2245    Lab Status: Final result Specimen: Urine, Clean Catch Updated: 08/10/24 2345     Color, UA Yellow     Clarity, UA Clear     Specific Gravity, UA 1.026     pH, UA 5.5     Leukocytes, UA Negative     Nitrite, UA Negative     Protein, UA 30 (1+) mg/dl      Glucose, UA Negative mg/dl      Ketones, UA Trace mg/dl      Urobilinogen, UA <2.0 mg/dl      Bilirubin, UA Negative     Occult Blood, UA Negative    hCG, quantitative [[id number]]  (Normal) Collected: 08/10/24 2249    Lab Status: Final result Specimen: Blood from Arm, Left Updated: 08/10/24 2331     HCG, Quant <0.6 mIU/mL     Narrative:       Expected Ranges:    HCG results between 5.0 and 25.0 mIU/mL may be indicative of early pregnancy but should be interpreted in light of the total clinical presentation.    HCG can rise to detectable levels in apolinar and post menopausal women (0-11.6 mIU/mL).     Approximate               Approximate HCG  Gestation age          Concentration ( mIU/mL)  _____________          ______________________   Weeks                      HCG values  0.2-1                       5-50  1-2                           2-3                         100-5000  3-4                         500-48919  4-5                         1000-69941  5-6                         [id number]  6-8                         13881-168623  8-12                        [id number]      Comprehensive metabolic panel [[id number]]   (Abnormal) Collected: 08/10/24 2249    Lab Status: Final result Specimen: Blood from Arm, Left Updated: 08/10/24 2326     Sodium 137 mmol/L      Potassium 3.8 mmol/L      Chloride 102 mmol/L      CO2 26 mmol/L      ANION GAP 9 mmol/L      BUN 22 mg/dL      Creatinine 1.52 mg/dL      Glucose 104 mg/dL      Calcium 9.8 mg/dL       U/L       U/L      Alkaline Phosphatase 93 U/L      Total Protein 7.1 g/dL      Albumin 4.6 g/dL      Total Bilirubin 0.76 mg/dL      eGFR 48 ml/min/1.73sq m     Narrative:      National Kidney Disease Foundation guidelines for Chronic Kidney Disease (CKD):     Stage 1 with normal or high GFR (GFR > 90 mL/min/1.73 square meters)    Stage 2 Mild CKD (GFR = 60-89 mL/min/1.73 square meters)    Stage 3A Moderate CKD (GFR = 45-59 mL/min/1.73 square meters)    Stage 3B Moderate CKD (GFR = 30-44 mL/min/1.73 square meters)    Stage 4 Severe CKD (GFR = 15-29 mL/min/1.73 square meters)    Stage 5 End Stage CKD (GFR <15 mL/min/1.73 square meters)  Note: GFR calculation is accurate only with a steady state creatinine    Lipase [[id number]]  (Normal) Collected: 08/10/24 2249    Lab Status: Final result Specimen: Blood from Arm, Left Updated: 08/10/24 2326     Lipase 28 u/L     CBC and differential [[id number]]  (Abnormal) Collected: 08/10/24 2249    Lab Status: Final result Specimen: Blood from Arm, Left Updated: 08/10/24 2302     WBC 9.30 Thousand/uL      RBC 4.65 Million/uL      Hemoglobin 13.7 g/dL      Hematocrit 42.9 %      MCV 92 fL      MCH 29.5 pg      MCHC 31.9 g/dL      RDW 12.6 %      MPV 9.3 fL      Platelets 238 Thousands/uL      nRBC 0 /100 WBCs      Segmented % 83 %      Immature Grans % 1 %      Lymphocytes % 9 %      Monocytes % 6 %      Eosinophils Relative 1 %      Basophils Relative 0 %      Absolute Neutrophils 7.76 Thousands/µL      Absolute Immature Grans 0.05 Thousand/uL      Absolute Lymphocytes 0.81 Thousands/µL      Absolute Monocytes 0.59 Thousand/µL       Eosinophils Absolute 0.07 Thousand/µL      Basophils Absolute 0.02 Thousands/µL     POCT pregnancy, urine [[id number]]  (Normal) Resulted: 08/10/24 2250    Lab Status: Final result Updated: 08/10/24 2250     EXT Preg Test, Ur Negative     Control Valid                   CT abdomen pelvis w contrast   Final Result by Irvin Cortez MD (08/11 0011)      No acute findings in the abdomen or pelvis.      Mild nonspecific periportal edema as well as mild diffuse gallbladder wall thickening with no calcified gallstones. Correlation with laboratory studies is recommended.      The appendix is not clearly visualized on this study. If symptoms persist, a repeat CT abdomen/pelvis after the administration of oral contrast could be performed for further evaluation.      Workstation performed: [id number]         US right upper quadrant    (Results Pending)         Procedures  Procedures        SBIRT 20yo+      Flowsheet Row Most Recent Value   Initial Alcohol Screen: US AUDIT-C     1. How often do you have a drink containing alcohol? 0 Filed at: 08/10/2024 2130   2. How many drinks containing alcohol do you have on a typical day you are drinking?  0 Filed at: 08/10/2024 2130   3b. FEMALE Any Age, or MALE 65+: How often do you have 4 or more drinks on one occassion? 0 Filed at: 08/10/2024 2130   Audit-C Score 0 Filed at: 08/10/2024 2130   ANTHONY: How many times in the past year have you...    Used an illegal drug or used a prescription medication for non-medical reasons? Never Filed at: 08/10/2024 2130                  Medical Decision Making  Amount and/or Complexity of Data Reviewed  Labs: ordered. Decision-making details documented in ED Course.  Radiology: ordered. Decision-making details documented in ED Course.    Risk  OTC drugs.  Prescription drug management.  Decision regarding hospitalization.      See ED course above for additional details including HPI, MDM including PLAN, and disposition.      Disposition  Final  diagnoses:   Cholecystitis   Abdominal pain   Nausea and vomiting     Time reflects when diagnosis was documented in both MDM as applicable and the Disposition within this note       Time User Action Codes Description Comment    8/11/2024 12:29 AM Karla Figueroau T Add [K81.9] Cholecystitis     8/11/2024  1:20 AM Figueroa, Thu T Add [R10.9] Abdominal pain     8/11/2024  1:20 AM Figueroa Thu T Add [R11.2] Nausea and vomiting           ED Disposition       ED Disposition   Admit    Condition   Stable    Date/Time   Sun Aug 11, 2024 0119    Comment   Case was discussed with red surgery and the patient's admission status was agreed to be Admission Status: observation status to the service of Dr. Schmidt.               Follow-up Information    None         Patient's Medications    No medications on file     No discharge procedures on file.    PDMP Review       None             ED Provider  Attending physically available and evaluated Jennifer Henry. I managed the patient along with the ED Attending.    Electronically Signed by           Vanessa Figueroa MD  08/11/24 0709

## 2024-08-12 ENCOUNTER — APPOINTMENT (OUTPATIENT)
Dept: RADIOLOGY | Facility: HOSPITAL | Age: 24
DRG: 392 | End: 2024-08-12
Payer: COMMERCIAL

## 2024-08-12 PROBLEM — R10.9 ABDOMINAL PAIN: Status: ACTIVE | Noted: 2024-08-12

## 2024-08-12 LAB
ALBUMIN SERPL BCG-MCNC: 3.5 G/DL (ref 3.5–5)
ALP SERPL-CCNC: 92 U/L (ref 34–104)
ALT SERPL W P-5'-P-CCNC: 546 U/L (ref 7–52)
ANION GAP SERPL CALCULATED.3IONS-SCNC: 7 MMOL/L (ref 4–13)
AST SERPL W P-5'-P-CCNC: 260 U/L (ref 13–39)
BASOPHILS # BLD AUTO: 0.03 THOUSANDS/ÂΜL (ref 0–0.1)
BASOPHILS NFR BLD AUTO: 1 % (ref 0–1)
BILIRUB DIRECT SERPL-MCNC: 0.13 MG/DL (ref 0–0.2)
BILIRUB SERPL-MCNC: 0.43 MG/DL (ref 0.2–1)
BUN SERPL-MCNC: 14 MG/DL (ref 5–25)
CALCIUM SERPL-MCNC: 9.2 MG/DL (ref 8.4–10.2)
CHLORIDE SERPL-SCNC: 105 MMOL/L (ref 96–108)
CK SERPL-CCNC: 68 U/L (ref 26–192)
CO2 SERPL-SCNC: 28 MMOL/L (ref 21–32)
CREAT SERPL-MCNC: 1.04 MG/DL (ref 0.6–1.3)
EOSINOPHIL # BLD AUTO: 0.22 THOUSAND/ÂΜL (ref 0–0.61)
EOSINOPHIL NFR BLD AUTO: 6 % (ref 0–6)
ERYTHROCYTE [DISTWIDTH] IN BLOOD BY AUTOMATED COUNT: 12.8 % (ref 11.6–15.1)
GFR SERPL CREATININE-BSD FRML MDRD: 76 ML/MIN/1.73SQ M
GLUCOSE P FAST SERPL-MCNC: 82 MG/DL (ref 65–99)
GLUCOSE SERPL-MCNC: 82 MG/DL (ref 65–140)
HCT VFR BLD AUTO: 37.4 % (ref 34.8–46.1)
HGB BLD-MCNC: 11.8 G/DL (ref 11.5–15.4)
IMM GRANULOCYTES # BLD AUTO: 0.01 THOUSAND/UL (ref 0–0.2)
IMM GRANULOCYTES NFR BLD AUTO: 0 % (ref 0–2)
LYMPHOCYTES # BLD AUTO: 2.16 THOUSANDS/ÂΜL (ref 0.6–4.47)
LYMPHOCYTES NFR BLD AUTO: 54 % (ref 14–44)
MCH RBC QN AUTO: 29.8 PG (ref 26.8–34.3)
MCHC RBC AUTO-ENTMCNC: 31.6 G/DL (ref 31.4–37.4)
MCV RBC AUTO: 94 FL (ref 82–98)
MONOCYTES # BLD AUTO: 0.35 THOUSAND/ÂΜL (ref 0.17–1.22)
MONOCYTES NFR BLD AUTO: 9 % (ref 4–12)
NEUTROPHILS # BLD AUTO: 1.19 THOUSANDS/ÂΜL (ref 1.85–7.62)
NEUTS SEG NFR BLD AUTO: 30 % (ref 43–75)
NRBC BLD AUTO-RTO: 0 /100 WBCS
PLATELET # BLD AUTO: 224 THOUSANDS/UL (ref 149–390)
PMV BLD AUTO: 9.8 FL (ref 8.9–12.7)
POTASSIUM SERPL-SCNC: 4.1 MMOL/L (ref 3.5–5.3)
PROT SERPL-MCNC: 5.5 G/DL (ref 6.4–8.4)
RBC # BLD AUTO: 3.96 MILLION/UL (ref 3.81–5.12)
SODIUM SERPL-SCNC: 140 MMOL/L (ref 135–147)
TSH SERPL DL<=0.05 MIU/L-ACNC: 2.05 UIU/ML (ref 0.45–4.5)
WBC # BLD AUTO: 3.96 THOUSAND/UL (ref 4.31–10.16)

## 2024-08-12 PROCEDURE — 80074 ACUTE HEPATITIS PANEL: CPT

## 2024-08-12 PROCEDURE — 80048 BASIC METABOLIC PNL TOTAL CA: CPT

## 2024-08-12 PROCEDURE — 80076 HEPATIC FUNCTION PANEL: CPT

## 2024-08-12 PROCEDURE — 85025 COMPLETE CBC W/AUTO DIFF WBC: CPT

## 2024-08-12 PROCEDURE — 74181 MRI ABDOMEN W/O CONTRAST: CPT

## 2024-08-12 PROCEDURE — 82550 ASSAY OF CK (CPK): CPT | Performed by: STUDENT IN AN ORGANIZED HEALTH CARE EDUCATION/TRAINING PROGRAM

## 2024-08-12 PROCEDURE — 99222 1ST HOSP IP/OBS MODERATE 55: CPT | Performed by: INTERNAL MEDICINE

## 2024-08-12 PROCEDURE — 99232 SBSQ HOSP IP/OBS MODERATE 35: CPT | Performed by: SURGERY

## 2024-08-12 PROCEDURE — 96366 THER/PROPH/DIAG IV INF ADDON: CPT

## 2024-08-12 PROCEDURE — 84443 ASSAY THYROID STIM HORMONE: CPT

## 2024-08-12 RX ORDER — OXYCODONE HYDROCHLORIDE 10 MG/1
10 TABLET ORAL EVERY 4 HOURS PRN
Status: DISCONTINUED | OUTPATIENT
Start: 2024-08-12 | End: 2024-08-13 | Stop reason: HOSPADM

## 2024-08-12 RX ORDER — OXYCODONE HYDROCHLORIDE 5 MG/1
5 TABLET ORAL EVERY 4 HOURS PRN
Status: DISCONTINUED | OUTPATIENT
Start: 2024-08-12 | End: 2024-08-13 | Stop reason: HOSPADM

## 2024-08-12 RX ORDER — HYDROMORPHONE HCL/PF 1 MG/ML
0.5 SYRINGE (ML) INJECTION
Status: DISCONTINUED | OUTPATIENT
Start: 2024-08-12 | End: 2024-08-13 | Stop reason: HOSPADM

## 2024-08-12 NOTE — DISCHARGE INSTR - AVS FIRST PAGE
General Surgery Discharge Instructions    Please call GI office and outpatient General Surgery Office to make an appointment. Please obtain CMP at lab facility prior to appointment.    Activity:  - You may resume activity as tolerated.    Return to work:    - You are clear to return school when feeling better    Diet:    - You may resume your normal diet.    Medications:  - You should continue your current medication regimenafter discharge unless otherwise instructed. Please refer to your discharge medication list for further details.    Additional Instructions:  - May shower daily   - If you have any questions or concerns after discharge please call the office.  - Call office or return to ER if fever greater than 101, chills, persistent nausea/vomiting, and/or worsening/uncontrollable pain.

## 2024-08-12 NOTE — PLAN OF CARE
Problem: PAIN - ADULT  Goal: Verbalizes/displays adequate comfort level or baseline comfort level  Description: Interventions:  - Encourage patient to monitor pain and request assistance  - Assess pain using appropriate pain scale  - Administer analgesics based on type and severity of pain and evaluate response  - Implement non-pharmacological measures as appropriate and evaluate response  - Consider cultural and social influences on pain and pain management  - Notify physician/advanced practitioner if interventions unsuccessful or patient reports new pain  Outcome: Progressing     Problem: INFECTION - ADULT  Goal: Absence or prevention of progression during hospitalization  Description: INTERVENTIONS:  - Assess and monitor for signs and symptoms of infection  - Monitor lab/diagnostic results  - Monitor all insertion sites, i.e. indwelling lines, tubes, and drains  - Monitor endotracheal if appropriate and nasal secretions for changes in amount and color  - [place] appropriate cooling/warming therapies per order  - Administer medications as ordered  - Instruct and encourage patient and family to use good hand hygiene technique  - Identify and instruct in appropriate isolation precautions for identified infection/condition  Outcome: Progressing  Goal: Absence of fever/infection during neutropenic period  Description: INTERVENTIONS:  - Monitor WBC    Outcome: Progressing     Problem: SAFETY ADULT  Goal: Patient will remain free of falls  Description: INTERVENTIONS:  - Educate patient/family on patient safety including physical limitations  - Instruct patient to call for assistance with activity   - Consult OT/PT to assist with strengthening/mobility   - Keep Call bell within reach  - Keep bed low and locked with side rails adjusted as appropriate  - Keep care items and personal belongings within reach  - Initiate and maintain comfort rounds  - Make Fall Risk Sign visible to staff  - Offer Toileting every 2 Hours,  in advance of need  - Initiate/Maintain bed/chair alarm  - Obtain necessary fall risk management equipment: nonskid footwear  - Apply yellow socks and bracelet for high fall risk patients  - Consider moving patient to room near nurses station  Outcome: Progressing     Problem: DISCHARGE PLANNING  Goal: Discharge to home or other facility with appropriate resources  Description: INTERVENTIONS:  - Identify barriers to discharge w/patient and caregiver  - Arrange for needed discharge resources and transportation as appropriate  - Identify discharge learning needs (meds, wound care, etc.)  - Arrange for interpretive services to assist at discharge as needed  - Refer to Case Management Department for coordinating discharge planning if the patient needs post-hospital services based on physician/advanced practitioner order or complex needs related to functional status, cognitive ability, or social support system  Outcome: Progressing     Problem: Knowledge Deficit  Goal: Patient/family/caregiver demonstrates understanding of disease process, treatment plan, medications, and discharge instructions  Description: Complete learning assessment and assess knowledge base.  Interventions:  - Provide teaching at level of understanding  - Provide teaching via preferred learning methods  Outcome: Progressing

## 2024-08-12 NOTE — UTILIZATION REVIEW
"Initial Clinical Review    WAS OUTPATIENT  8/11 CONVERTED TO INPATIENT ADMISSION 8/12 @ 1607 DUE TO CONTINUED STAY REQUIRED TO CARE FOR PATIENT WITH Nausea, vomiting, and abdominal pain. Workup and treat    Admission: Date/Time/Statement:   Admission Orders (From admission, onward)       Ordered        08/12/24 1721  INPATIENT ADMISSION  Once            08/12/24 1700  Place in Observation  Once                     Outpatient No Charge Bed  (Outpatient No Charge Bed/Extended Recovery)  Once        Transfer Service: Surgery-General    08/11/24 011       Orders Placed This Encounter   Procedures    INPATIENT ADMISSION     Standing Status:   Standing     Number of Occurrences:   1     Order Specific Question:   Level of Care     Answer:   Med Surg [16]     Order Specific Question:   Estimated length of stay     Answer:   More than 2 Midnights     Order Specific Question:   Certification     Answer:   I certify that inpatient services are medically necessary for this patient for a duration of greater than two midnights. See H&P and MD Progress Notes for additional information about the patient's course of treatment.       ED Arrival Information       Expected   -    Arrival   8/10/2024 21:28    Acuity   Urgent              Means of arrival   Walk-In    Escorted by   Self    Service   Surgery-General    Admission type   Emergency              Arrival complaint   vOMITING             Chief Complaint   Patient presents with    Abdominal Pain     R sided. Started today after a soccer game, no injury, no sick contacts, \"we all ate the same thing and I'm the only one who's sick\". NVD, unable to keep liquids down       Initial Presentation: 23 y.o. female to ED presents for nausea, vomiting and abdominal pain which started this past evening. Pt c/o h started this past evening. Patient reports numerous episodes of vomiting and first thought it was food poisoning. Reports has not had this kind of abdominal pain before. Also " endorses chills. Imaging remarkable for GB wall thickening and elevated LFTs.  Outpatient Admit; Nausea, vomiting, and abdominal pain. AST/ALT: 308/237. Cr 1.52  Plan; RUQ US. F/u GABO, CRP, LFTs. Analgesia and antiemetics.   On exam; Abd tenderness. Epigastric and RUQ tenderness.  CT imaging demonstrative of diffuse GB wall thickening.     8/11  Progress notes; Likely not a gallbladder etiology. Concern for Hepatitis. GI consult. Pain control.  On exam; Soft, mildly tender in epigastrium and RUQ, nondistended   RUQ US- Nonspecific gallbladder wall thickening and pericholecystic edema without gallstones, sludge or significant gallbladder distention      8/12 Changed to Inpatient status  Progress notes; RUQ pain and elevated LFTS of unknown etiology   GI consult pend. Trend LFTs. Diet as tolerated. Consider viral hepatitis panel. Pain/ anti nausea meds.     GI cons; Abnormal LFTs. Right sided abdominal pain.   Viral hepatitis panel pending. Lowest inpatient BP 95/53, unlikely to be cause of ischemic hepatopathy   UA with 1+ protein and trace ketones, possible dehydration   RUQ US with nonspecific gallbladder wall thickening and pericholecystic edema without gallstones, sludge or significant gallbladder distention.     Date: 8/13   Day 2:   Progress notes; MRCP with nonspecific gallbladder wall edema with decreasing LFTs.  Mono screen. F/u GI plan. Tolerating a diet. Pain and nausea control prn.   Outpt HIDA once LFTs have normalized.     Per GI; Transaminitis  AST/ALT continue to improving. CMV/EBV pending.  Viral hepatitis negative.   MRI/MRCP showing nonspecific gallbladder wall edema without cholelithiasis. No biliary dilation or strictures identified. No choledocholithiasis. No suspicious masses.      ED Triage Vitals [08/10/24 2132]   Temperature Pulse Respirations Blood Pressure SpO2 Pain Score   98.6 °F (37 °C) 86 17 108/90 99 % 8     Weight (last 2 days)       Date/Time Weight    08/12/24 21:55:49 62.1 (137)     08/11/24 1353 60.8 (134)            Vital Signs (last 3 days)       Date/Time Temp Pulse Resp BP MAP (mmHg) SpO2 O2 Device Patient Position - Orthostatic VS Pain    08/13/24 0900 -- -- -- -- -- -- -- -- No Pain    08/13/24 07:37:09 97.8 °F (36.6 °C) 58 15 117/78 91 96 % -- -- --    08/12/24 21:55:49 98 °F (36.7 °C) -- 17 122/75 91 -- -- Sitting --    08/12/24 2000 -- -- -- -- -- -- -- -- No Pain    08/12/24 1100 -- -- -- -- -- -- -- -- No Pain    08/12/24 07:25:26 97.9 °F (36.6 °C) 45 18 116/72 87 95 % -- -- --    08/11/24 2100 -- -- -- -- -- -- None (Room air) -- No Pain    08/11/24 1353 98 °F (36.7 °C) -- 16 -- -- -- None (Room air) -- No Pain    08/11/24 13:45:45 97.6 °F (36.4 °C) 61 17 108/56 73 97 % -- -- --    08/11/24 1244 -- 60 16 106/71 -- 98 % None (Room air) -- 1 08/11/24 1100 -- -- -- 95/53 68 -- -- -- --    08/11/24 1045 97.1 °F (36.2 °C) -- 16 95/53 64 -- None (Room air) -- 1 08/11/24 1035 -- -- -- -- -- -- -- -- 1 08/11/24 0613 -- 60 16 103/55 -- 98 % None (Room air) Lying --    08/11/24 0142 -- -- -- -- -- -- -- -- 6    08/11/24 0125 -- 60 16 105/70 -- 99 % None (Room air) Lying 4    08/10/24 2345 -- -- -- -- -- -- -- -- No Pain    08/10/24 2251 -- -- -- -- -- -- -- -- 3    08/10/24 2132 98.6 °F (37 °C) 86 17 108/90 -- 99 % None (Room air) -- 8          Pertinent Labs/Diagnostic Test Results:   Radiology:  MRI abdomen wo contrast and mrcp   Final Interpretation by David Bravo MD (08/13 6916)      Nonspecific gallbladder wall edema again identified without evidence cholelithiasis. Normal biliary tree.         Workstation performed: [id number]         US right upper quadrant   Final Interpretation by E. Alec Schoenberger, MD (08/11 6506)   Nonspecific gallbladder wall thickening and pericholecystic edema without gallstones, sludge or significant gallbladder distention.   Given periportal edema, consider hepatitis or other etiologies over acalculus cholecystitis.   Correlate with  LFTs. Biliary scintigraphy can be obtained for further evaluation if there is continued clinical concern for cholecystitis.            Workstation performed: [id number]         CT abdomen pelvis w contrast   Final Interpretation by Irvin Cortez MD (08/11 0011)      No acute findings in the abdomen or pelvis.      Mild nonspecific periportal edema as well as mild diffuse gallbladder wall thickening with no calcified gallstones. Correlation with laboratory studies is recommended.      The appendix is not clearly visualized on this study. If symptoms persist, a repeat CT abdomen/pelvis after the administration of oral contrast could be performed for further evaluation.      Workstation performed: [id number]           Cardiology:  No orders to display     GI:  No orders to display           Results from last 7 days   Lab Units 08/12/24  0552 08/10/24  2249   WBC Thousand/uL 3.96* 9.30   HEMOGLOBIN g/dL 11.8 13.7   HEMATOCRIT % 37.4 42.9   PLATELETS Thousands/uL 224 238   TOTAL NEUT ABS Thousands/µL 1.19* 7.76*         Results from last 7 days   Lab Units 08/13/24 0425 08/12/24 0552 08/11/24  0610 08/10/24  2249   SODIUM mmol/L 140 140 137 137   POTASSIUM mmol/L 3.9 4.1 4.4 3.8   CHLORIDE mmol/L 105 105 104 102   CO2 mmol/L 26 28 26 26   ANION GAP mmol/L 9 7 7 9   BUN mg/dL 14 14 18 22   CREATININE mg/dL 0.98 1.04 1.18 1.52*   EGFR ml/min/1.73sq m 82 76 66 48   CALCIUM mg/dL 9.7 9.2 9.2 9.8     Results from last 7 days   Lab Units 08/13/24 0425 08/12/24 0552 08/11/24  0610 08/10/24  2249   AST U/L 114* 260* 962* 308*   ALT U/L 411* 546* 989* 237*   ALK PHOS U/L 93 92 106* 93   TOTAL PROTEIN g/dL 6.3* 5.5* 6.0* 7.1   ALBUMIN g/dL 4.0 3.5 3.9 4.6   TOTAL BILIRUBIN mg/dL 0.39 0.43 1.47* 0.76   BILIRUBIN DIRECT mg/dL  --  0.13  --   --          Results from last 7 days   Lab Units 08/13/24 0425 08/12/24  0552 08/11/24  0610 08/10/24  2249   GLUCOSE RANDOM mg/dL 80 82 92 104       Results from last 7 days   Lab  Units 08/12/24  0552   CK TOTAL U/L 68                 Results from last 7 days   Lab Units 08/12/24  0552   TSH 3RD GENERATON uIU/mL 2.051     Results from last 7 days   Lab Units 08/10/24  2249   LIPASE u/L 28     Results from last 7 days   Lab Units 08/11/24  0126 08/10/24  2249   CRP mg/L 9.3*  --    SED RATE mm/hour  --  4             Results from last 7 days   Lab Units 08/10/24  2245   CLARITY UA  Clear   COLOR UA  Yellow   SPEC GRAV UA  1.026   PH UA  5.5   GLUCOSE UA mg/dl Negative   KETONES UA mg/dl Trace*   BLOOD UA  Negative   PROTEIN UA mg/dl 30 (1+)*   NITRITE UA  Negative   BILIRUBIN UA  Negative   UROBILINOGEN UA (BE) mg/dl <2.0   LEUKOCYTES UA  Negative   WBC UA /hpf None Seen   RBC UA /hpf None Seen   BACTERIA UA /hpf Occasional   EPITHELIAL CELLS WET PREP /hpf Occasional   MUCUS THREADS  Occasional*         ED Treatment-Medication Administration from 08/10/2024 2128 to 08/11/2024 1327         Date/Time Order Dose Route Action     08/10/2024 2200 ondansetron (ZOFRAN-ODT) dispersible tablet 4 mg 4 mg Oral Given     08/10/2024 2251 ketorolac (TORADOL) injection 15 mg 15 mg Intravenous Given     08/10/2024 2251 acetaminophen (TYLENOL) tablet 650 mg 650 mg Oral Given     08/10/2024 2333 iohexol (OMNIPAQUE) 350 MG/ML injection (MULTI-DOSE) 100 mL 100 mL Intravenous Given     08/11/2024 0012 multi-electrolyte (ISOLYTE-S PH 7.4) bolus 1,000 mL 1,000 mL Intravenous New Bag     08/11/2024 0610 heparin (porcine) subcutaneous injection 5,000 Units 5,000 Units Subcutaneous Given     08/11/2024 0142 traMADol (ULTRAM) tablet 50 mg 50 mg Oral Given     08/11/2024 1243 multi-electrolyte (PLASMALYTE-A/ISOLYTE-S PH 7.4) IV solution 50 mL/hr Intravenous New Bag            History reviewed. No pertinent past medical history.  Present on Admission:  **None**      Admitting Diagnosis: Cholecystitis [K81.9]  Vomiting [R11.10]  Abdominal pain [R10.9]  Nausea and vomiting [R11.2]  Age/Sex: 23 y.o. female  Admission  Orders:  Scheduled Medications: None     Continuous IV Infusions:   multi-electrolyte (PLASMALYTE-A/ISOLYTE-S PH 7.4) IV solution  Rate: 50 mL/hr Dose: 50 mL/hr  Freq: Continuous Route: IV  Indications of Use: IV Hydration  Last Dose: Stopped (08/12/24 0954)  Start: 08/11/24 1245 End: 08/12/24 0934    PRN Meds:  acetaminophen, 650 mg, Oral, Q6H PRN  HYDROmorphone, 0.5 mg, Intravenous, Q3H PRN  ondansetron, 4 mg, Intravenous, Q6H PRN  oxyCODONE, 10 mg, Oral, Q4H PRN  oxyCODONE, 5 mg, Oral, Q4H PRN        IP CONSULT TO GASTROENTEROLOGY    Network Utilization Review Department  ATTENTION: Please call with any questions or concerns to [phone] and carefully listen to the prompts so that you are directed to the right person. All voicemails are confidential.   For Discharge needs, contact Care Management DC Support Team at [phone] opt. 2  Send all requests for admission clinical reviews, approved or denied determinations and any other requests to dedicated fax number below belonging to the campus where the patient is receiving treatment. List of dedicated fax numbers for the Facilities:  FACILITY NAME UR FAX NUMBER   ADMISSION DENIALS (Administrative/Medical Necessity) [phone]   DISCHARGE SUPPORT TEAM (NETWORK) [phone]   PARENT CHILD HEALTH (Maternity/NICU/Pediatrics) [phone]   [facility] [phone]   [facility] [phone]   [facility] [phone]   [facility] [phone]   [facility] [phone]   [facility] [phone]   [facility] [phone]   [facility] [phone]   [facility] [phone]   [facility] [phone]   [facility] [phone]   [facility]  [facility] [phone]

## 2024-08-12 NOTE — CONSULTS
Consultation -  [facility]  Jennifer Henry 23 y.o. female MRN: [id number]  Unit/Bed#: -[place] Encounter: [id number]            Inpatient consult to gastroenterology     Date/Time  8/12/2024 10:43 AM     Performed by  Vito Srivastava DO   Authorized by  Vu Bravo MD             Reason for Consult / Principal Problem:     Abnormal LFTs of unknown origin      ASSESSMENT AND PLAN:        Abnormal LFTs   Right sided abdominal pain  - On presentation AST/ALT//237/93. Increased 8/11 to 962/989/106. Today improved to 260/546/92.  -RUQ US with nonspecific gallbladder wall thickening and pericholecystic edema without gallstones, sludge or significant gallbladder distention.   -CT ABD with mild nonspecific periportal edema as well as mild diffuse gallbladder wall thickening with no calcified gallstones.   - Viral hepatitis panel pending  - TSH normal  - Lipase, hCG, GABO normal  - Lowest inpatient BP 95/53, unlikely to be cause of ischemic hepatopathy  - UA with 1+ protein and trace ketones, possible dehydration  - Reports vigorous exercises running approximately 12 miles before symptom onset, but this is usual for her  - Patient feels well and is tolerating oral intake, can have outpatient follow up with MRCP to look for micro-chololithiasis with outpatient follow up  ______________________________________________________________________    HPI:  23 year old female with no known past medical history presented 8/10 for sudden onset right sided abdominal pain and nausea. Describes the pain as cramping and relieved by leaning forward, aggravated by standing straight. Had approximately 6 episodes of non-bloody emesis prior to arrival. Reports she had just finished playing soccer and eating a meal of spaghettis and meatballs prior to onset. Patient estimates she ran approximately 12 miles which is normal for her. No one else on the team had similar symptoms after eating. Pain and nausea  "resolved by the following day and has not recurred. Has been tolerating oral intake well.       REVIEW OF SYSTEMS:    CONSTITUTIONAL: Denies any fever, chills, rigors, and weight loss.  HEENT: No earache or tinnitus. Denies hearing loss or visual disturbances.  CARDIOVASCULAR: No chest pain or palpitations.   RESPIRATORY: Denies any cough, hemoptysis, shortness of breath or dyspnea on exertion.  GASTROINTESTINAL: As noted in the History of Present Illness.   GENITOURINARY: No problems with urination. Denies any hematuria or dysuria.  NEUROLOGIC: No dizziness or vertigo, denies headaches.   MUSCULOSKELETAL: Denies any muscle or joint pain.   SKIN: Denies skin rashes or itching.   ENDOCRINE: Denies excessive thirst. Denies intolerance to heat or cold.  PSYCHOSOCIAL: Denies depression or anxiety. Denies any recent memory loss.       Historical Information   History reviewed. No pertinent past medical history.  History reviewed. No pertinent surgical history.  Social History   Social History     Substance and Sexual Activity   Alcohol Use Never     Social History     Substance and Sexual Activity   Drug Use Never     Social History     Tobacco Use   Smoking Status Never   Smokeless Tobacco Never     History reviewed. No pertinent family history.    Meds/Allergies     No medications prior to admission.  Current Facility-Administered Medications   Medication Dose Route Frequency    acetaminophen (TYLENOL) tablet 650 mg  650 mg Oral Q6H PRN    HYDROmorphone (DILAUDID) injection 0.5 mg  0.5 mg Intravenous Q3H PRN    ondansetron (ZOFRAN) injection 4 mg  4 mg Intravenous Q6H PRN    oxyCODONE (ROXICODONE) immediate release tablet 10 mg  10 mg Oral Q4H PRN    oxyCODONE (ROXICODONE) IR tablet 5 mg  5 mg Oral Q4H PRN       No Known Allergies        Objective     Blood pressure 116/72, pulse (!) 45, temperature 97.9 °F (36.6 °C), resp. rate 18, height 5' 7\" (1.702 m), weight 60.8 kg (134 lb), SpO2 95%. Body mass index is 20.99 " kg/m².    No intake or output data in the 24 hours ending 08/12/24 0954      PHYSICAL EXAM:      General Appearance:   Alert, cooperative, no distress   HEENT:   Normocephalic, atraumatic, anicteric.     Neck:  Supple, symmetrical, trachea midline   Lungs:   Clear to auscultation bilaterally; no rales, rhonchi or wheezing; respirations unlabored    Heart::   Regular rate and rhythm; no murmur, rub, or gallop.   Abdomen:   Soft, non-tender, non-distended; normal bowel sounds; no masses, no organomegaly    Genitalia:   Deferred    Rectal:   Deferred    Extremities:  No cyanosis, clubbing or edema    Pulses:  2+ and symmetric all extremities    Skin:  No jaundice, rashes, or lesions    Lymph nodes:  No palpable cervical lymphadenopathy        Lab Results:   Admission on 08/10/2024   Component Date Value    EXT Preg Test, Ur 08/10/2024 Negative     Control 08/10/2024 Valid     WBC 08/10/2024 9.30     RBC 08/10/2024 4.65     Hemoglobin 08/10/2024 13.7     Hematocrit 08/10/2024 42.9     MCV 08/10/2024 92     MCH 08/10/2024 29.5     MCHC 08/10/2024 31.9     RDW 08/10/2024 12.6     MPV 08/10/2024 9.3     Platelets 08/10/2024 238     nRBC 08/10/2024 0     Segmented % 08/10/2024 83 (H)     Immature Grans % 08/10/2024 1     Lymphocytes % 08/10/2024 9 (L)     Monocytes % 08/10/2024 6     Eosinophils Relative 08/10/2024 1     Basophils Relative 08/10/2024 0     Absolute Neutrophils 08/10/2024 7.76 (H)     Absolute Immature Grans 08/10/2024 0.05     Absolute Lymphocytes 08/10/2024 0.81     Absolute Monocytes 08/10/2024 0.59     Eosinophils Absolute 08/10/2024 0.07     Basophils Absolute 08/10/2024 0.02     Sodium 08/10/2024 137     Potassium 08/10/2024 3.8     Chloride 08/10/2024 102     CO2 08/10/2024 26     ANION GAP 08/10/2024 9     BUN 08/10/2024 22     Creatinine 08/10/2024 1.52 (H)     Glucose 08/10/2024 104     Calcium 08/10/2024 9.8     AST 08/10/2024 308 (H)     ALT 08/10/2024 237 (H)     Alkaline Phosphatase 08/10/2024  93     Total Protein 08/10/2024 7.1     Albumin 08/10/2024 4.6     Total Bilirubin 08/10/2024 0.76     eGFR 08/10/2024 48     Lipase 08/10/2024 28     HCG, Quant 08/10/2024 <0.6     Color, UA 08/10/2024 Yellow     Clarity, UA 08/10/2024 Clear     Specific Gravity, UA 08/10/2024 1.026     pH, UA 08/10/2024 5.5     Leukocytes, UA 08/10/2024 Negative     Nitrite, UA 08/10/2024 Negative     Protein, UA 08/10/2024 30 (1+) (A)     Glucose, UA 08/10/2024 Negative     Ketones, UA 08/10/2024 Trace (A)     Urobilinogen, UA 08/10/2024 <2.0     Bilirubin, UA 08/10/2024 Negative     Occult Blood, UA 08/10/2024 Negative     RBC, UA 08/10/2024 None Seen     WBC, UA 08/10/2024 None Seen     Epithelial Cells 08/10/2024 Occasional     Bacteria, UA 08/10/2024 Occasional     MUCUS THREADS 08/10/2024 Occasional (A)     GABO 08/11/2024 Negative     CRP 08/11/2024 9.3 (H)     Sodium 08/11/2024 137     Potassium 08/11/2024 4.4     Chloride 08/11/2024 104     CO2 08/11/2024 26     ANION GAP 08/11/2024 7     BUN 08/11/2024 18     Creatinine 08/11/2024 1.18     Glucose 08/11/2024 92     Calcium 08/11/2024 9.2     AST 08/11/2024 962 (H)     ALT 08/11/2024 989 (H)     Alkaline Phosphatase 08/11/2024 106 (H)     Total Protein 08/11/2024 6.0 (L)     Albumin 08/11/2024 3.9     Total Bilirubin 08/11/2024 1.47 (H)     eGFR 08/11/2024 66     Sed Rate 08/10/2024 4     WBC 08/12/2024 3.96 (L)     RBC 08/12/2024 3.96     Hemoglobin 08/12/2024 11.8     Hematocrit 08/12/2024 37.4     MCV 08/12/2024 94     MCH 08/12/2024 29.8     MCHC 08/12/2024 31.6     RDW 08/12/2024 12.8     MPV 08/12/2024 9.8     Platelets 08/12/2024 224     nRBC 08/12/2024 0     Segmented % 08/12/2024 30 (L)     Immature Grans % 08/12/2024 0     Lymphocytes % 08/12/2024 54 (H)     Monocytes % 08/12/2024 9     Eosinophils Relative 08/12/2024 6     Basophils Relative 08/12/2024 1     Absolute Neutrophils 08/12/2024 1.19 (L)     Absolute Immature Grans 08/12/2024 0.01     Absolute  Lymphocytes 08/12/2024 2.16     Absolute Monocytes 08/12/2024 0.35     Eosinophils Absolute 08/12/2024 0.22     Basophils Absolute 08/12/2024 0.03     Sodium 08/12/2024 140     Potassium 08/12/2024 4.1     Chloride 08/12/2024 105     CO2 08/12/2024 28     ANION GAP 08/12/2024 7     BUN 08/12/2024 14     Creatinine 08/12/2024 1.04     Glucose 08/12/2024 82     Glucose, Fasting 08/12/2024 82     Calcium 08/12/2024 9.2     eGFR 08/12/2024 76     Total Bilirubin 08/12/2024 0.43     Bilirubin, Direct 08/12/2024 0.13     Alkaline Phosphatase 08/12/2024 92     AST 08/12/2024 260 (H)     ALT 08/12/2024 546 (H)     Total Protein 08/12/2024 5.5 (L)     Albumin 08/12/2024 3.5        Imaging Studies: I have personally reviewed pertinent imaging studies.

## 2024-08-12 NOTE — PLAN OF CARE
Problem: PAIN - ADULT  Goal: Verbalizes/displays adequate comfort level or baseline comfort level  Description: Interventions:  - Encourage patient to monitor pain and request assistance  - Assess pain using appropriate pain scale  - Administer analgesics based on type and severity of pain and evaluate response  - Implement non-pharmacological measures as appropriate and evaluate response  - Consider cultural and social influences on pain and pain management  - Notify physician/advanced practitioner if interventions unsuccessful or patient reports new pain  8/12/2024 1250 by Vanessa Noonan RN  Outcome: Progressing  8/12/2024 1223 by Vanessa Noonan RN  Outcome: Progressing     Problem: INFECTION - ADULT  Goal: Absence or prevention of progression during hospitalization  Description: INTERVENTIONS:  - Assess and monitor for signs and symptoms of infection  - Monitor lab/diagnostic results  - Monitor all insertion sites, i.e. indwelling lines, tubes, and drains  - Monitor endotracheal if appropriate and nasal secretions for changes in amount and color  - [place] appropriate cooling/warming therapies per order  - Administer medications as ordered  - Instruct and encourage patient and family to use good hand hygiene technique  - Identify and instruct in appropriate isolation precautions for identified infection/condition  8/12/2024 1250 by Vanessa Noonan RN  Outcome: Progressing  8/12/2024 1223 by Vanessa Noonan RN  Outcome: Progressing  Goal: Absence of fever/infection during neutropenic period  Description: INTERVENTIONS:  - Monitor WBC    8/12/2024 1250 by Vanessa Noonan RN  Outcome: Progressing  8/12/2024 1223 by Vanessa Noonan RN  Outcome: Progressing     Problem: SAFETY ADULT  Goal: Patient will remain free of falls  Description: INTERVENTIONS:  - Educate patient/family on patient safety including physical limitations  - Instruct patient to call for assistance with activity   - Consult OT/PT to assist with  strengthening/mobility   - Keep Call bell within reach  - Keep bed low and locked with side rails adjusted as appropriate  - Keep care items and personal belongings within reach  - Initiate and maintain comfort rounds  - Make Fall Risk Sign visible to staff  - Offer Toileting every 2-4 Hours, in advance of need  - Initiate/Maintain bed/chair alarm  - Obtain necessary fall risk management equipment: nonskid footwear   - Apply yellow socks and bracelet for high fall risk patients  - Consider moving patient to room near nurses station  8/12/2024 1250 by Vanessa Noonan RN  Outcome: Progressing  8/12/2024 1223 by Vanessa Noonan RN  Outcome: Progressing     Problem: DISCHARGE PLANNING  Goal: Discharge to home or other facility with appropriate resources  Description: INTERVENTIONS:  - Identify barriers to discharge w/patient and caregiver  - Arrange for needed discharge resources and transportation as appropriate  - Identify discharge learning needs (meds, wound care, etc.)  - Arrange for interpretive services to assist at discharge as needed  - Refer to Case Management Department for coordinating discharge planning if the patient needs post-hospital services based on physician/advanced practitioner order or complex needs related to functional status, cognitive ability, or social support system  8/12/2024 1250 by Vanessa Noonan RN  Outcome: Progressing  8/12/2024 1223 by Vanessa Noonan RN  Outcome: Progressing     Problem: Knowledge Deficit  Goal: Patient/family/caregiver demonstrates understanding of disease process, treatment plan, medications, and discharge instructions  Description: Complete learning assessment and assess knowledge base.  Interventions:  - Provide teaching at level of understanding  - Provide teaching via preferred learning methods  8/12/2024 1250 by Vanessa Noonan RN  Outcome: Progressing  8/12/2024 1223 by Vanessa Noonan RN  Outcome: Progressing

## 2024-08-12 NOTE — PROGRESS NOTES
"Progress Note - General Surgery  Jennifer Henry 23 y.o. female MRN: [id number]  Unit/Bed#: -Gavin Encounter: [id number]    Assessment:  24 yo female with right upper quadrant pain and elevated LFTS of unknown etiology    Afebrile.VSS.  No I and Os recorded  Am labs pending    Plan:  Diet as tolerated  GI consult  Consider viral hepatitis panel  PRN pain meds  PRN anti nausea meds  Trend LFTS    Subjective/Objective     Subjective:   No acute events overnight. Patient states she has no abdominal pain. Denies having nausea, vomiting, fevers, chills, chest pain, shortness of breath. Tolerating PO intake. Voiding without difficulty. Having bowel movements and passing flatus.     Objective:     Blood pressure 108/56, pulse 61, temperature 98 °F (36.7 °C), temperature source Oral, resp. rate 16, height 5' 7\" (1.702 m), weight 60.8 kg (134 lb), SpO2 97%.,Body mass index is 20.99 kg/m².    No intake or output data in the 24 hours ending 08/12/24 0554    Invasive Devices       Peripheral Intravenous Line  Duration             Peripheral IV 08/10/24 Left Antecubital 1 day                    General: NAD  HENT: NCAT MMM  Neck: supple, no JVD  CV: nl rate  Lungs: nl wob. No resp distress  ABD: Soft, nontender, nondistended  Extrem: No CCE  Neuro: AAOx3       Scheduled Meds:  Current Facility-Administered Medications   Medication Dose Route Frequency Provider Last Rate    acetaminophen  650 mg Oral Q6H PRN Rito Manzo MD      HYDROmorphone  0.5 mg Intravenous Q3H PRN Vu Bravo MD      multi-electrolyte  50 mL/hr Intravenous Continuous Vu Bravo MD 50 mL/hr (08/11/24 1243)    ondansetron  4 mg Intravenous Q6H PRN Rito Manzo MD      oxyCODONE  10 mg Oral Q4H PRN Vu Bravo MD      oxyCODONE  5 mg Oral Q4H PRN Vu Bravo MD       Continuous Infusions:multi-electrolyte, 50 mL/hr, Last Rate: 50 mL/hr (08/11/24 1243)      PRN Meds:.  acetaminophen    HYDROmorphone    ondansetron    oxyCODONE    " oxyCODONE    Labs:     CHEM - BUN/Cr/glu/ALT/AST/amyl/lip:18/1.18/--/989*/962*/--/-- (08/11 0610)     LYTES - Na/K/Cl/CO2: 137/4.4/104/26 (08/11 0610)    Lab, Imaging and other studies:I have personally reviewed pertinent lab results.    VTE Pharmacologic Prophylaxis: none  VTE Mechanical Prophylaxis: sequential compression device      Vu Bravo MD  8/12/2024 5:54 AM

## 2024-08-12 NOTE — PLAN OF CARE
Problem: PAIN - ADULT  Goal: Verbalizes/displays adequate comfort level or baseline comfort level  Description: Interventions:  - Encourage patient to monitor pain and request assistance  - Assess pain using appropriate pain scale  - Administer analgesics based on type and severity of pain and evaluate response  - Implement non-pharmacological measures as appropriate and evaluate response  - Consider cultural and social influences on pain and pain management  - Notify physician/advanced practitioner if interventions unsuccessful or patient reports new pain  Outcome: Progressing     Problem: INFECTION - ADULT  Goal: Absence or prevention of progression during hospitalization  Description: INTERVENTIONS:  - Assess and monitor for signs and symptoms of infection  - Monitor lab/diagnostic results  - Monitor all insertion sites, i.e. indwelling lines, tubes, and drains  - Monitor endotracheal if appropriate and nasal secretions for changes in amount and color  - [place] appropriate cooling/warming therapies per order  - Administer medications as ordered  - Instruct and encourage patient and family to use good hand hygiene technique  - Identify and instruct in appropriate isolation precautions for identified infection/condition  Outcome: Progressing  Goal: Absence of fever/infection during neutropenic period  Description: INTERVENTIONS:  - Monitor WBC    Outcome: Progressing     Problem: SAFETY ADULT  Goal: Patient will remain free of falls  Description: INTERVENTIONS:  - Educate patient/family on patient safety including physical limitations  - Instruct patient to call for assistance with activity   - Consult OT/PT to assist with strengthening/mobility   - Keep Call bell within reach  - Keep bed low and locked with side rails adjusted as appropriate  - Keep care items and personal belongings within reach  - Initiate and maintain comfort rounds  - Make Fall Risk Sign visible to staff  - Offer Toileting every 2-4  Hours, in advance of need  - Initiate/Maintain bed/chair alarm  - Obtain necessary fall risk management equipment: nonskid footwear   - Apply yellow socks and bracelet for high fall risk patients  - Consider moving patient to room near nurses station  Outcome: Progressing     Problem: DISCHARGE PLANNING  Goal: Discharge to home or other facility with appropriate resources  Description: INTERVENTIONS:  - Identify barriers to discharge w/patient and caregiver  - Arrange for needed discharge resources and transportation as appropriate  - Identify discharge learning needs (meds, wound care, etc.)  - Arrange for interpretive services to assist at discharge as needed  - Refer to Case Management Department for coordinating discharge planning if the patient needs post-hospital services based on physician/advanced practitioner order or complex needs related to functional status, cognitive ability, or social support system  Outcome: Progressing     Problem: Knowledge Deficit  Goal: Patient/family/caregiver demonstrates understanding of disease process, treatment plan, medications, and discharge instructions  Description: Complete learning assessment and assess knowledge base.  Interventions:  - Provide teaching at level of understanding  - Provide teaching via preferred learning methods  Outcome: Progressing

## 2024-08-13 VITALS
WEIGHT: 137 LBS | HEIGHT: 67 IN | HEART RATE: 58 BPM | RESPIRATION RATE: 15 BRPM | OXYGEN SATURATION: 96 % | DIASTOLIC BLOOD PRESSURE: 78 MMHG | BODY MASS INDEX: 21.5 KG/M2 | TEMPERATURE: 97.8 F | SYSTOLIC BLOOD PRESSURE: 117 MMHG

## 2024-08-13 LAB
ALBUMIN SERPL BCG-MCNC: 4 G/DL (ref 3.5–5)
ALP SERPL-CCNC: 93 U/L (ref 34–104)
ALT SERPL W P-5'-P-CCNC: 411 U/L (ref 7–52)
ANION GAP SERPL CALCULATED.3IONS-SCNC: 9 MMOL/L (ref 4–13)
AST SERPL W P-5'-P-CCNC: 114 U/L (ref 13–39)
BILIRUB SERPL-MCNC: 0.39 MG/DL (ref 0.2–1)
BUN SERPL-MCNC: 14 MG/DL (ref 5–25)
CALCIUM SERPL-MCNC: 9.7 MG/DL (ref 8.4–10.2)
CHLORIDE SERPL-SCNC: 105 MMOL/L (ref 96–108)
CO2 SERPL-SCNC: 26 MMOL/L (ref 21–32)
CREAT SERPL-MCNC: 0.98 MG/DL (ref 0.6–1.3)
GFR SERPL CREATININE-BSD FRML MDRD: 82 ML/MIN/1.73SQ M
GLUCOSE SERPL-MCNC: 80 MG/DL (ref 65–140)
HAV IGM SER QL: NORMAL
HBV CORE IGM SER QL: NORMAL
HBV SURFACE AG SER QL: NORMAL
HCV AB SER QL: NORMAL
HETEROPH AB SER QL: NEGATIVE
POTASSIUM SERPL-SCNC: 3.9 MMOL/L (ref 3.5–5.3)
PROT SERPL-MCNC: 6.3 G/DL (ref 6.4–8.4)
SODIUM SERPL-SCNC: 140 MMOL/L (ref 135–147)

## 2024-08-13 PROCEDURE — 86665 EPSTEIN-BARR CAPSID VCA: CPT | Performed by: STUDENT IN AN ORGANIZED HEALTH CARE EDUCATION/TRAINING PROGRAM

## 2024-08-13 PROCEDURE — 99238 HOSP IP/OBS DSCHRG MGMT 30/<: CPT

## 2024-08-13 PROCEDURE — NC001 PR NO CHARGE: Performed by: STUDENT IN AN ORGANIZED HEALTH CARE EDUCATION/TRAINING PROGRAM

## 2024-08-13 PROCEDURE — 99232 SBSQ HOSP IP/OBS MODERATE 35: CPT | Performed by: INTERNAL MEDICINE

## 2024-08-13 PROCEDURE — 86308 HETEROPHILE ANTIBODY SCREEN: CPT | Performed by: STUDENT IN AN ORGANIZED HEALTH CARE EDUCATION/TRAINING PROGRAM

## 2024-08-13 PROCEDURE — 86645 CMV ANTIBODY IGM: CPT | Performed by: STUDENT IN AN ORGANIZED HEALTH CARE EDUCATION/TRAINING PROGRAM

## 2024-08-13 PROCEDURE — 86644 CMV ANTIBODY: CPT | Performed by: STUDENT IN AN ORGANIZED HEALTH CARE EDUCATION/TRAINING PROGRAM

## 2024-08-13 PROCEDURE — 86663 EPSTEIN-BARR ANTIBODY: CPT | Performed by: STUDENT IN AN ORGANIZED HEALTH CARE EDUCATION/TRAINING PROGRAM

## 2024-08-13 PROCEDURE — 86664 EPSTEIN-BARR NUCLEAR ANTIGEN: CPT | Performed by: STUDENT IN AN ORGANIZED HEALTH CARE EDUCATION/TRAINING PROGRAM

## 2024-08-13 PROCEDURE — 80053 COMPREHEN METABOLIC PANEL: CPT

## 2024-08-13 NOTE — CASE MANAGEMENT
Case Management Discharge Planning Note    [facility]  [facility] MRN [id number]  : 2000 Date 2024       Current Admission Date: 8/10/2024  Current Admission Diagnosis:Abdominal pain   Patient Active Problem List    Diagnosis Date Noted Date Diagnosed    Abdominal pain 2024       LOS (days): 1  Geometric Mean LOS (GMLOS) (days):   Days to GMLOS:     OBJECTIVE:  Risk of Unplanned Readmission Score: 5.29         Current admission status: Inpatient   Preferred Pharmacy:   [id number] - [place], [place] - [address] [address]  Phone: [phone] Fax: [phone]    Primary Care Provider: No primary care provider on file.    Primary Insurance: BLUE CROSS  Secondary Insurance:     DISCHARGE DETAILS:    Discharge planning discussed with:: Patient         Other Referral/Resources/Interventions Provided:  Referral Comments: Note received from provider for pt to return to school/athletic activities.  Met w/pt and pt's mother at bedside & provided note to pt.  Stated her  is requesting a letter from all providers.  Explained the letter she has should be enough but if she needs anything additional, she can contact CM - phone # give to pt for CM.  DC order noted.

## 2024-08-13 NOTE — PLAN OF CARE
Problem: PAIN - ADULT  Goal: Verbalizes/displays adequate comfort level or baseline comfort level  Description: Interventions:  - Encourage patient to monitor pain and request assistance  - Assess pain using appropriate pain scale  - Administer analgesics based on type and severity of pain and evaluate response  - Implement non-pharmacological measures as appropriate and evaluate response  - Consider cultural and social influences on pain and pain management  - Notify physician/advanced practitioner if interventions unsuccessful or patient reports new pain  Outcome: Adequate for Discharge     Problem: INFECTION - ADULT  Goal: Absence or prevention of progression during hospitalization  Description: INTERVENTIONS:  - Assess and monitor for signs and symptoms of infection  - Monitor lab/diagnostic results  - Monitor all insertion sites, i.e. indwelling lines, tubes, and drains  - Monitor endotracheal if appropriate and nasal secretions for changes in amount and color  - [place] appropriate cooling/warming therapies per order  - Administer medications as ordered  - Instruct and encourage patient and family to use good hand hygiene technique  - Identify and instruct in appropriate isolation precautions for identified infection/condition  Outcome: Adequate for Discharge  Goal: Absence of fever/infection during neutropenic period  Description: INTERVENTIONS:  - Monitor WBC    Outcome: Adequate for Discharge     Problem: SAFETY ADULT  Goal: Patient will remain free of falls  Description: INTERVENTIONS:  - Educate patient/family on patient safety including physical limitations  - Instruct patient to call for assistance with activity   - Consult OT/PT to assist with strengthening/mobility   - Keep Call bell within reach  - Keep bed low and locked with side rails adjusted as appropriate  - Keep care items and personal belongings within reach  - Initiate and maintain comfort rounds  - Make Fall Risk Sign visible to  staff  - Offer Toileting every 2-4 Hours, in advance of need  - Initiate/Maintain bed/chair alarm  - Obtain necessary fall risk management equipment: nonskid footwear   - Apply yellow socks and bracelet for high fall risk patients  - Consider moving patient to room near nurses station  Outcome: Adequate for Discharge     Problem: DISCHARGE PLANNING  Goal: Discharge to home or other facility with appropriate resources  Description: INTERVENTIONS:  - Identify barriers to discharge w/patient and caregiver  - Arrange for needed discharge resources and transportation as appropriate  - Identify discharge learning needs (meds, wound care, etc.)  - Arrange for interpretive services to assist at discharge as needed  - Refer to Case Management Department for coordinating discharge planning if the patient needs post-hospital services based on physician/advanced practitioner order or complex needs related to functional status, cognitive ability, or social support system  Outcome: Adequate for Discharge     Problem: Knowledge Deficit  Goal: Patient/family/caregiver demonstrates understanding of disease process, treatment plan, medications, and discharge instructions  Description: Complete learning assessment and assess knowledge base.  Interventions:  - Provide teaching at level of understanding  - Provide teaching via preferred learning methods  Outcome: Adequate for Discharge

## 2024-08-13 NOTE — PROGRESS NOTES
"Progress Note - General Surgery   Jennifer Henry 23 y.o. female MRN: [id number]  Unit/Bed#: -Gavin Encounter: [id number]    Assessment:  Jennifer Henry is a 23 y.o. female with right upper quadrant pain and elevated LFTS of unknown etiology.    Plan:  - Diet as tolerated  - f/u MRCP results  - appreciate GI recs  - prn pain and nausea control  - Trend LFTS  - plan to discharge today pending imaging and labs with GI outpatient follow-up    Subjective/Objective     Subjective: NAEON. Vital signs stable. Patient reports no pain this morning. Tolerating a diet. Voiding spontaneously and having normal bowel function. Denies fever, chills, nausea, vomiting.     Objective:     Blood pressure 122/75, pulse (!) 45, temperature 98 °F (36.7 °C), temperature source Oral, resp. rate 17, height 5' 7\" (1.702 m), weight 62.1 kg (137 lb), SpO2 95%.,Body mass index is 21.46 kg/m².      Intake/Output Summary (Last 24 hours) at 8/13/2024 0531  Last data filed at 8/12/2024 1225  Gross per 24 hour   Intake 120 ml   Output --   Net 120 ml       Invasive Devices       Peripheral Intravenous Line  Duration             Peripheral IV 08/10/24 Left Antecubital 2 days                    Physical Exam: General appearance: alert and oriented, in no acute distress  Lungs: normal wob  Heart: well perfused, regular rate  Abdomen: soft, non tender, non distended    Lab, Imaging and other studies:I have personally reviewed pertinent lab results.    VTE Pharmacologic Prophylaxis: none  VTE Mechanical Prophylaxis: sequential compression device    Blanca Nathan MD  General Surgery Resident     "

## 2024-08-13 NOTE — UTILIZATION REVIEW
NOTIFICATION OF INPATIENT ADMISSION   AUTHORIZATION REQUEST   SERVICING FACILITY:   [facility]  Address: [address] [place] [address]  Tax ID: [id number]  NPI: [id number] ATTENDING PROVIDER:  Attending Name and NPI#: Chris Schmidt Md [[id number]]  Address: [address] [place] [address]  Phone: [phone]   ADMISSION INFORMATION:  Place of Service: Inpatient [facility]  Place of Service Code: 21  Inpatient Admission Date/Time: 8/12/24  4:07 PM  Discharge Date/Time: No discharge date for patient encounter.  Admitting Diagnosis Code/Description:  Cholecystitis [K81.9]  Vomiting [R11.10]  Abdominal pain [R10.9]  Nausea and vomiting [R11.2]     UTILIZATION REVIEW CONTACT:  Noam Yates Utilization   Network Utilization Review Department  Phone: [phone]  Fax: [phone]  Email: [email or web address]  Contact for approvals/pending authorizations, clinical reviews, and discharge.     PHYSICIAN ADVISORY SERVICES:  Medical Necessity Denial & [place]-rg-Abgn Review  Phone: [phone]  Fax: [phone]  Email: [email or web address]     DISCHARGE SUPPORT TEAM:  For Patients Discharge Needs & Updates  Phone: [phone] opt. 2 Fax: [phone]  Email: [email or web address]

## 2024-08-13 NOTE — DISCHARGE SUMMARY
"  Discharge Summary - Jennifer Henry 23 y.o. female MRN: [id number]    Unit/Bed#: -01 Encounter: [id number]    Admission Date:   Admission Orders (From admission, onward)       Ordered        08/12/24 1721  INPATIENT ADMISSION  Once            08/12/24 1700  Place in Observation  Once            08/12/24 1607  INPATIENT ADMISSION  Once                            Admitting Diagnosis: Cholecystitis [K81.9]  Vomiting [R11.10]  Abdominal pain [R10.9]  Nausea and vomiting [R11.2]    HPI: As per resident Dr. Manzo \"Jennifer Henry is a 23 y.o. female with no PMHx presents to ED with nausea, vomiting and abdominal pain which started this past evening. Patient reports numerous episodes of vomiting and first thought it was food poisoning. Reports has not had this kind of abdominal pain before. Also endorses chills. Denies chest pain, SOB, radiating pain. Imaging remarkable for GB wall thickening and elevated LFTs.\"    Procedures Performed: No orders of the defined types were placed in this encounter.      Summary of Hospital Course:   Patient is a 23 year old F who presented to the ED on 8/11/2024 with nausea, vomiting, and abdominal pain. Patient was admitted under general surgery service for further evaluation. Patient had a RUQ ultrasound down which was significant for nonspecific gallbladder wall thickening and pericholecystic edema without gallstones or sludge or significant gallbladder distension. GI was consulted on 8/12/2024 and they recommended an MRCP and outpatient follow up. MRCP was done which was significant for nonspecific gallbladder wall edema again identified without evidence of cholelithiasis and normal biliary tree. Imaging without any acute abdominal findings and patient was tolerating a diet and her pain was well controlled patient was stable for discharge.    By date of discharge on 8/13/2024, patient was deemed appropriate for discharge. Patient was tolerating a diet. Patients " pain was well controlled. Patient was voiding spontaneously. Patient had bowel function. Patient was ambulatory. Patient was discharged into the care of her family on 8/13/2024.     Patient was instructed to follow up with PCP within a week to review hospitalization and laboratory studies. Patient instructed to follow up outpatient with GI. Patient instructed to follow up with outpatient general surgery clinic.     For further information regarding this hospitalization please refer to medical records.    Significant Findings, Care, Treatment and Services Provided: MRI abdomen wo contrast and mrcp    Result Date: 8/13/2024  Impression: Nonspecific gallbladder wall edema again identified without evidence cholelithiasis. Normal biliary tree. Workstation performed: [id number]     US right upper quadrant    Result Date: 8/11/2024  Impression: Nonspecific gallbladder wall thickening and pericholecystic edema without gallstones, sludge or significant gallbladder distention. Given periportal edema, consider hepatitis or other etiologies over acalculus cholecystitis. Correlate with LFTs. Biliary scintigraphy can be obtained for further evaluation if there is continued clinical concern for cholecystitis. Workstation performed: [id number]     CT abdomen pelvis w contrast    Result Date: 8/11/2024  Impression: No acute findings in the abdomen or pelvis. Mild nonspecific periportal edema as well as mild diffuse gallbladder wall thickening with no calcified gallstones. Correlation with laboratory studies is recommended. The appendix is not clearly visualized on this study. If symptoms persist, a repeat CT abdomen/pelvis after the administration of oral contrast could be performed for further evaluation. Workstation performed: [id number]       Complications: None    Discharge Diagnosis: Abdominal pain    Medical Problems       Resolved Problems  Date Reviewed: 8/13/2024   None         Condition at Discharge: good         Discharge  instructions/Information to patient and family:   See after visit summary for information provided to patient and family.      Provisions for Follow-Up Care:  See after visit summary for information related to follow-up care and any pertinent home health orders.      PCP: No primary care provider on file.    Disposition: Home    Planned Readmission: No    Discharge Statement   I spent 20 minutes discharging the patient. This time was spent on the day of discharge. I had direct contact with the patient on the day of discharge. Additional documentation is required if more than 30 minutes were spent on discharge.     Discharge Medications:  See after visit summary for reconciled discharge medications provided to patient and family.       Vanessa Cardoza PA-C

## 2024-08-13 NOTE — PLAN OF CARE
Problem: PAIN - ADULT  Goal: Verbalizes/displays adequate comfort level or baseline comfort level  Description: Interventions:  - Encourage patient to monitor pain and request assistance  - Assess pain using appropriate pain scale  - Administer analgesics based on type and severity of pain and evaluate response  - Implement non-pharmacological measures as appropriate and evaluate response  - Consider cultural and social influences on pain and pain management  - Notify physician/advanced practitioner if interventions unsuccessful or patient reports new pain  8/13/2024 1054 by Vanessa Noonan RN  Outcome: Adequate for Discharge  8/13/2024 1037 by Vanessa Noonan RN  Outcome: Adequate for Discharge     Problem: INFECTION - ADULT  Goal: Absence or prevention of progression during hospitalization  Description: INTERVENTIONS:  - Assess and monitor for signs and symptoms of infection  - Monitor lab/diagnostic results  - Monitor all insertion sites, i.e. indwelling lines, tubes, and drains  - Monitor endotracheal if appropriate and nasal secretions for changes in amount and color  - [place] appropriate cooling/warming therapies per order  - Administer medications as ordered  - Instruct and encourage patient and family to use good hand hygiene technique  - Identify and instruct in appropriate isolation precautions for identified infection/condition  8/13/2024 1054 by Vanessa Noonan RN  Outcome: Adequate for Discharge  8/13/2024 1037 by Vanessa Noonan RN  Outcome: Adequate for Discharge  Goal: Absence of fever/infection during neutropenic period  Description: INTERVENTIONS:  - Monitor WBC    8/13/2024 1054 by Vanessa Noonan RN  Outcome: Adequate for Discharge  8/13/2024 1037 by Vanessa Noonan RN  Outcome: Adequate for Discharge     Problem: SAFETY ADULT  Goal: Patient will remain free of falls  Description: INTERVENTIONS:  - Educate patient/family on patient safety including physical limitations  - Instruct patient to call  for assistance with activity   - Consult OT/PT to assist with strengthening/mobility   - Keep Call bell within reach  - Keep bed low and locked with side rails adjusted as appropriate  - Keep care items and personal belongings within reach  - Initiate and maintain comfort rounds  - Make Fall Risk Sign visible to staff  - Offer Toileting every 2-4 Hours, in advance of need  - Initiate/Maintain bed/chair alarm  - Obtain necessary fall risk management equipment: nonskid footwear   - Apply yellow socks and bracelet for high fall risk patients  - Consider moving patient to room near nurses station  8/13/2024 1054 by Vanessa Noonan RN  Outcome: Adequate for Discharge  8/13/2024 1037 by Vanessa Noonan RN  Outcome: Adequate for Discharge     Problem: Knowledge Deficit  Goal: Patient/family/caregiver demonstrates understanding of disease process, treatment plan, medications, and discharge instructions  Description: Complete learning assessment and assess knowledge base.  Interventions:  - Provide teaching at level of understanding  - Provide teaching via preferred learning methods  8/13/2024 1054 by Vanessa Noonan RN  Outcome: Adequate for Discharge  8/13/2024 1037 by Vanessa Noonan RN  Outcome: Adequate for Discharge     Problem: DISCHARGE PLANNING  Goal: Discharge to home or other facility with appropriate resources  Description: INTERVENTIONS:  - Identify barriers to discharge w/patient and caregiver  - Arrange for needed discharge resources and transportation as appropriate  - Identify discharge learning needs (meds, wound care, etc.)  - Arrange for interpretive services to assist at discharge as needed  - Refer to Case Management Department for coordinating discharge planning if the patient needs post-hospital services based on physician/advanced practitioner order or complex needs related to functional status, cognitive ability, or social support system  8/13/2024 1054 by Vanessa Noonan RN  Outcome: Adequate for  Discharge  8/13/2024 1037 by Vanessa Noonan, GAIL  Outcome: Adequate for Discharge

## 2024-08-13 NOTE — UTILIZATION REVIEW
NOTIFICATION OF ADMISSION DISCHARGE   This is a Notification of Discharge from [facility]. Please be advised that this patient has been discharge from our facility. Below you will find the admission and discharge date and time including the patient’s disposition.   UTILIZATION REVIEW CONTACT:  Noam Yates  Utilization   Network Utilization Review Department  Phone: [phone] x carefully listen to the prompts. All voicemails are confidential.  Email: [email or web address]     ADMISSION INFORMATION  PRESENTATION DATE: 8/10/2024  9:37 PM  OBERVATION ADMISSION DATE: 08/12/2024 1700  INPATIENT ADMISSION DATE: 8/12/24  4:07 PM   DISCHARGE DATE: 8/13/2024 11:16 AM   DISPOSITION:Home/Self Care    Network Utilization Review Department  ATTENTION: Please call with any questions or concerns to [phone] and carefully listen to the prompts so that you are directed to the right person. All voicemails are confidential.   For Discharge needs, contact Care Management DC Support Team at [phone] opt. 2  Send all requests for admission clinical reviews, approved or denied determinations and any other requests to dedicated fax number below belonging to the campus where the patient is receiving treatment. List of dedicated fax numbers for the Facilities:  FACILITY NAME UR FAX NUMBER   ADMISSION DENIALS (Administrative/Medical Necessity) [phone]   DISCHARGE SUPPORT TEAM ([facility]) [phone]   PARENT CHILD HEALTH (Maternity/NICU/Pediatrics) [phone]   [facility] [phone]   General acute hospital [phone]   [facility] [phone]   [facility] [phone]   [facility] [phone]   [facility] [phone]   [facility] [phone]   [facility]  [phone]   [facility] [phone]   [facility] [phone]   [facility] [phone]   [facility] [phone]

## 2024-08-13 NOTE — PROGRESS NOTES
"Progress Note- Jennifer Henry 23 y.o. female MRN: [id number]    Unit/Bed#: MS [facility] Encounter: [id number]      Assessment and Plan:    Transaminitis  -MRI/MRCP showing nonspecific gallbladder wall edema without cholelithiasis. No biliary dilation or strictures identified. No choledocholithiasis. No suspicious masses.  -Viral hepatitis negative. TSH, CK WNL.  -AST/ALT continue to improve to   Lab Results   Component Value Date     (H) 08/13/2024     (H) 08/13/2024    ALKPHOS 93 08/13/2024   - Will repeat in one week as outpatient to confirm return to within normal limits.  - CMV/EBV pending. Can follow up as outpatient or use Writtent.    ______________________________________________________________________    Subjective:     Feeling completely fine with no complaints. Anxious to return to playing soccer. Results discussed with patient. Patient and family excited that no worrisome cause had been elicited for brief transaminitis.     Medication Administration - last 24 hours from 08/12/2024 0949 to 08/13/2024 0949         Date/Time Order Dose Route Action Action by     08/12/2024 0954 EDT multi-electrolyte (PLASMALYTE-A/ISOLYTE-S PH 7.4) IV solution 0 mL/hr Intravenous Stopped Vanessa Noonan RN            Objective:     Vitals: Blood pressure 117/78, pulse 58, temperature 97.8 °F (36.6 °C), resp. rate 15, height 5' 7\" (1.702 m), weight 62.1 kg (137 lb), SpO2 96%.,Body mass index is 21.46 kg/m².      Intake/Output Summary (Last 24 hours) at 8/13/2024 0949  Last data filed at 8/12/2024 1225  Gross per 24 hour   Intake 120 ml   Output --   Net 120 ml       Physical Exam:   General Appearance: Awake and alert, in no acute distress  Abdomen: Soft, non-tender, non-distended; bowel sounds normal; no masses or no organomegaly    Invasive Devices       Peripheral Intravenous Line  Duration             Peripheral IV 08/10/24 Left Antecubital 2 days                    Lab Results:  Admission on 08/10/2024 "   Component Date Value    EXT Preg Test, Ur 08/10/2024 Negative     Control 08/10/2024 Valid     WBC 08/10/2024 9.30     RBC 08/10/2024 4.65     Hemoglobin 08/10/2024 13.7     Hematocrit 08/10/2024 42.9     MCV 08/10/2024 92     MCH 08/10/2024 29.5     MCHC 08/10/2024 31.9     RDW 08/10/2024 12.6     MPV 08/10/2024 9.3     Platelets 08/10/2024 238     nRBC 08/10/2024 0     Segmented % 08/10/2024 83 (H)     Immature Grans % 08/10/2024 1     Lymphocytes % 08/10/2024 9 (L)     Monocytes % 08/10/2024 6     Eosinophils Relative 08/10/2024 1     Basophils Relative 08/10/2024 0     Absolute Neutrophils 08/10/2024 7.76 (H)     Absolute Immature Grans 08/10/2024 0.05     Absolute Lymphocytes 08/10/2024 0.81     Absolute Monocytes 08/10/2024 0.59     Eosinophils Absolute 08/10/2024 0.07     Basophils Absolute 08/10/2024 0.02     Sodium 08/10/2024 137     Potassium 08/10/2024 3.8     Chloride 08/10/2024 102     CO2 08/10/2024 26     ANION GAP 08/10/2024 9     BUN 08/10/2024 22     Creatinine 08/10/2024 1.52 (H)     Glucose 08/10/2024 104     Calcium 08/10/2024 9.8     AST 08/10/2024 308 (H)     ALT 08/10/2024 237 (H)     Alkaline Phosphatase 08/10/2024 93     Total Protein 08/10/2024 7.1     Albumin 08/10/2024 4.6     Total Bilirubin 08/10/2024 0.76     eGFR 08/10/2024 48     Lipase 08/10/2024 28     HCG, Quant 08/10/2024 <0.6     Color, UA 08/10/2024 Yellow     Clarity, UA 08/10/2024 Clear     Specific Gravity, UA 08/10/2024 1.026     pH, UA 08/10/2024 5.5     Leukocytes, UA 08/10/2024 Negative     Nitrite, UA 08/10/2024 Negative     Protein, UA 08/10/2024 30 (1+) (A)     Glucose, UA 08/10/2024 Negative     Ketones, UA 08/10/2024 Trace (A)     Urobilinogen, UA 08/10/2024 <2.0     Bilirubin, UA 08/10/2024 Negative     Occult Blood, UA 08/10/2024 Negative     RBC, UA 08/10/2024 None Seen     WBC, UA 08/10/2024 None Seen     Epithelial Cells 08/10/2024 Occasional     Bacteria, UA 08/10/2024 Occasional     MUCUS THREADS  08/10/2024 Occasional (A)     GABO 08/11/2024 Negative     CRP 08/11/2024 9.3 (H)     Sodium 08/11/2024 137     Potassium 08/11/2024 4.4     Chloride 08/11/2024 104     CO2 08/11/2024 26     ANION GAP 08/11/2024 7     BUN 08/11/2024 18     Creatinine 08/11/2024 1.18     Glucose 08/11/2024 92     Calcium 08/11/2024 9.2     AST 08/11/2024 962 (H)     ALT 08/11/2024 989 (H)     Alkaline Phosphatase 08/11/2024 106 (H)     Total Protein 08/11/2024 6.0 (L)     Albumin 08/11/2024 3.9     Total Bilirubin 08/11/2024 1.47 (H)     eGFR 08/11/2024 66     Sed Rate 08/10/2024 4     WBC 08/12/2024 3.96 (L)     RBC 08/12/2024 3.96     Hemoglobin 08/12/2024 11.8     Hematocrit 08/12/2024 37.4     MCV 08/12/2024 94     MCH 08/12/2024 29.8     MCHC 08/12/2024 31.6     RDW 08/12/2024 12.8     MPV 08/12/2024 9.8     Platelets 08/12/2024 224     nRBC 08/12/2024 0     Segmented % 08/12/2024 30 (L)     Immature Grans % 08/12/2024 0     Lymphocytes % 08/12/2024 54 (H)     Monocytes % 08/12/2024 9     Eosinophils Relative 08/12/2024 6     Basophils Relative 08/12/2024 1     Absolute Neutrophils 08/12/2024 1.19 (L)     Absolute Immature Grans 08/12/2024 0.01     Absolute Lymphocytes 08/12/2024 2.16     Absolute Monocytes 08/12/2024 0.35     Eosinophils Absolute 08/12/2024 0.22     Basophils Absolute 08/12/2024 0.03     Sodium 08/12/2024 140     Potassium 08/12/2024 4.1     Chloride 08/12/2024 105     CO2 08/12/2024 28     ANION GAP 08/12/2024 7     BUN 08/12/2024 14     Creatinine 08/12/2024 1.04     Glucose 08/12/2024 82     Glucose, Fasting 08/12/2024 82     Calcium 08/12/2024 9.2     eGFR 08/12/2024 76     Total Bilirubin 08/12/2024 0.43     Bilirubin, Direct 08/12/2024 0.13     Alkaline Phosphatase 08/12/2024 92     AST 08/12/2024 260 (H)     ALT 08/12/2024 546 (H)     Total Protein 08/12/2024 5.5 (L)     Albumin 08/12/2024 3.5     Hepatitis B Surface Ag 08/12/2024 Non-reactive     Hep A IgM 08/12/2024 Non-reactive     Hepatitis C Ab  08/12/2024 Non-reactive     Hep B C IgM 08/12/2024 Non-reactive     Total CK 08/12/2024 68     TSH 3RD GENERATON 08/12/2024 2.051     Sodium 08/13/2024 140     Potassium 08/13/2024 3.9     Chloride 08/13/2024 105     CO2 08/13/2024 26     ANION GAP 08/13/2024 9     BUN 08/13/2024 14     Creatinine 08/13/2024 0.98     Glucose 08/13/2024 80     Calcium 08/13/2024 9.7     AST 08/13/2024 114 (H)     ALT 08/13/2024 411 (H)     Alkaline Phosphatase 08/13/2024 93     Total Protein 08/13/2024 6.3 (L)     Albumin 08/13/2024 4.0     Total Bilirubin 08/13/2024 0.39     eGFR 08/13/2024 82        Imaging Studies: I have personally reviewed pertinent imaging studies.

## 2024-08-14 LAB
CMV IGG SERPL QL IA: NEGATIVE
CMV IGM SERPL QL IA: NEGATIVE
EBV EA IGG SER QL IA: NEGATIVE
EBV NA IGG SER QL IA: POSITIVE
EBV VCA IGG SER QL IA: POSITIVE
EBV VCA IGM SER QL IA: NEGATIVE

## 2024-08-23 ENCOUNTER — APPOINTMENT (OUTPATIENT)
Dept: LAB | Facility: CLINIC | Age: 24
End: 2024-08-23
Payer: COMMERCIAL

## 2024-08-23 DIAGNOSIS — R10.9 ABDOMINAL PAIN: ICD-10-CM

## 2024-08-23 LAB
ALBUMIN SERPL BCG-MCNC: 4.5 G/DL (ref 3.5–5)
ALP SERPL-CCNC: 86 U/L (ref 34–104)
ALT SERPL W P-5'-P-CCNC: 65 U/L (ref 7–52)
ANION GAP SERPL CALCULATED.3IONS-SCNC: 9 MMOL/L (ref 4–13)
AST SERPL W P-5'-P-CCNC: 25 U/L (ref 13–39)
BILIRUB SERPL-MCNC: 0.5 MG/DL (ref 0.2–1)
BUN SERPL-MCNC: 28 MG/DL (ref 5–25)
CALCIUM SERPL-MCNC: 9.6 MG/DL (ref 8.4–10.2)
CHLORIDE SERPL-SCNC: 103 MMOL/L (ref 96–108)
CO2 SERPL-SCNC: 26 MMOL/L (ref 21–32)
CREAT SERPL-MCNC: 0.99 MG/DL (ref 0.6–1.3)
GFR SERPL CREATININE-BSD FRML MDRD: 81 ML/MIN/1.73SQ M
GLUCOSE P FAST SERPL-MCNC: 67 MG/DL (ref 65–99)
POTASSIUM SERPL-SCNC: 4 MMOL/L (ref 3.5–5.3)
PROT SERPL-MCNC: 7.2 G/DL (ref 6.4–8.4)
SODIUM SERPL-SCNC: 138 MMOL/L (ref 135–147)

## 2024-08-23 PROCEDURE — 36415 COLL VENOUS BLD VENIPUNCTURE: CPT

## 2024-08-23 PROCEDURE — 80053 COMPREHEN METABOLIC PANEL: CPT

## 2024-08-30 ENCOUNTER — OFFICE VISIT (OUTPATIENT)
Dept: GASTROENTEROLOGY | Facility: CLINIC | Age: 24
End: 2024-08-30
Payer: COMMERCIAL

## 2024-08-30 ENCOUNTER — TELEPHONE (OUTPATIENT)
Dept: GASTROENTEROLOGY | Facility: CLINIC | Age: 24
End: 2024-08-30

## 2024-08-30 VITALS
BODY MASS INDEX: 21.35 KG/M2 | HEIGHT: 67 IN | SYSTOLIC BLOOD PRESSURE: 128 MMHG | WEIGHT: 136 LBS | DIASTOLIC BLOOD PRESSURE: 78 MMHG

## 2024-08-30 DIAGNOSIS — K80.50 BILIARY COLIC: Primary | ICD-10-CM

## 2024-08-30 DIAGNOSIS — R79.89 ELEVATED LFTS: ICD-10-CM

## 2024-08-30 PROCEDURE — 99203 OFFICE O/P NEW LOW 30 MIN: CPT | Performed by: NURSE PRACTITIONER

## 2024-08-30 RX ORDER — SPIRONOLACTONE 25 MG/1
25 TABLET ORAL
COMMUNITY
Start: 2024-08-06

## 2024-08-30 NOTE — PROGRESS NOTES
[facility] - Outpatient Consultation  Jennifer Henry 23 y.o. female MRN: [id number]  Encounter: [id number]    ASSESSMENT AND PLAN:      1.  Right upper quadrant/epigastric pain  2.  Possible biliary colic  3.  Elevated LFTs  Recent [facility] admission 8/10/2024 with acute onset nausea, vomiting, diarrhea and right upper quadrant pain which occurred 2 hours postprandially  CT scan abdomen/pelvis in ED showed gallbladder wall thickening and pericholecystic edema however no cholelithiasis, no obstructive choledocholithiasis  Abdominal ultrasound 8/11 and MRCP also negative for choledocholithiasis or dilated biliary ducts  LFTs initially elevated and peaked 8/11-consistent with possible bile duct stone that passed  Symptoms resolved within 24 hours and have not reoccurred  Denies right upper quadrant pain, tolerating regular diet well, appetite fair, no change in bowels  No GERD symptoms  No indication to proceed with EGD at this time  -Will check HIDA scan to evaluate gallbladder function  -Recheck LFTs in 1 week  -Recommend follow-up with surgery for possible cholecystectomy if indicated      Followup Appointment: As needed  ______________________________________________________________________    Chief Complaint   Patient presents with   • Diarrhea     Vomiting and diarrhea and went to ER, had EFT's and inflamed gallbladder, was inpatient at [facility] in [place]        HPI:   Jennifer Henry is a 23 y.o. year old female who presents in follow-up after recent [facility] hospitalization with acute onset of epigastric/right upper quadrant abdominal pain with associated nausea, vomiting and diarrhea  Symptoms began 2 hours after eating at a restaurant-spaghetti and meatballs.  Patient felt may be foodborne illness  CT scan abdomen/pelvis in the ED 8/10 showed gallbladder wall thickening  LFTs 8/2 elevated with , , normal alkaline  phosphatase at 93 and normal total bilirubin at 0.76  LFTs did peak the following day on 8/11 with , , alkaline phosphatase 106 and total bilirubin 1.47  LFTs continue to trend down during hospitalization  Outpatient labs 8/23/2024 revealed near normalization with AST 25, ALT minimally elevated at 65, alkaline phosphatase normal at 86 and total bilirubin normal at 0.50    Right upper quadrant ultrasound 8/11 again showed bladder wall thickening and pericholecystic edema without gallstones or sludge, normal CBD, no intrahepatic biliary dilatation and no choledocholithiasis  MRI/MRCP 8/12 verified normal biliary tree, normal CBD and nonspecific gallbladder wall edema  Patient's symptoms improved with IV fluids and pain medication after 24 hours.   Followed by general surgery throughout hospitalization but no GI consultation.    She was tolerating p.o. and was discharged to home 8/13/2024 with recommendations for outpatient GI and surgery follow-up    Since discharge she has been feeling well.  Denies any GERD symptoms.  No recurrent right upper quadrant or epigastric discomfort.  Her appetite is good and she denies any nausea or vomiting.  Able to eat normally without abdominal pain  Moving bowels daily-reports brown stools.  No melena or rectal bleeding  No right upper quadrant pain on exam at today's visit      Historical Information   History reviewed. No pertinent past medical history.  Past Surgical History:   Procedure Laterality Date   • WISDOM TOOTH EXTRACTION       Social History     Substance and Sexual Activity   Alcohol Use Never     Social History     Substance and Sexual Activity   Drug Use Never     Social History     Tobacco Use   Smoking Status Never   Smokeless Tobacco Never     Family History   Problem Relation Age of Onset   • Colon cancer Neg Hx    • Colon polyps Neg Hx        Meds/Allergies     Current Outpatient Medications:   •  spironolactone (ALDACTONE) 25 mg tablet    No Known  "Allergies    PHYSICAL EXAM:    Blood pressure 128/78, height 5' 7\" (1.702 m), weight 61.7 kg (136 lb). Body mass index is 21.3 kg/m².  General Appearance: NAD, cooperative, alert  Eyes: Anicteric, PERRLA, EOMI  ENT:  Normocephalic, atraumatic, normal mucosa.    Neck:  Supple, symmetrical, trachea midline,   Resp:  Clear to auscultation bilaterally; no rales, rhonchi or wheezing; respirations unlabored   CV:  S1 S2, Regular rate and rhythm; no murmur, rub, or gallop.  GI:  Soft, non-tender, non-distended; normal bowel sounds; no masses, no organomegaly   Rectal: Deferred  Musculoskeletal: No cyanosis, clubbing or edema. Normal ROM.  Skin:  No jaundice, rashes, or lesions   Heme/Lymph: No palpable cervical lymphadenopathy  Psych: Normal affect, good eye contact  Neuro: No gross deficits, AAOx3    Lab Results:   Lab Results   Component Value Date    WBC 3.96 (L) 08/12/2024    HGB 11.8 08/12/2024    HCT 37.4 08/12/2024    MCV 94 08/12/2024     08/12/2024     Lab Results   Component Value Date    K 4.0 08/23/2024     08/23/2024    CO2 26 08/23/2024    BUN 28 (H) 08/23/2024    CREATININE 0.99 08/23/2024    GLUF 67 08/23/2024    CALCIUM 9.6 08/23/2024    AST 25 08/23/2024    ALT 65 (H) 08/23/2024    ALKPHOS 86 08/23/2024    EGFR 81 08/23/2024     No results found for: \"IRON\", \"TIBC\", \"FERRITIN\"  Lab Results   Component Value Date    LIPASE 28 08/10/2024       Radiology Results:   MRI abdomen wo contrast and mrcp    Result Date: 8/13/2024  Narrative: MRI OF THE ABDOMEN WITHOUT CONTRAST WITH MRCP INDICATION: 23 years / Female. Elevated LFTs, evaluation for micro-cholelithiasis. COMPARISON: TECHNIQUE: Multiplanar/multisequence MRI of the abdomen with 3D MRCP was performed without the administration of contrast. FINDINGS: LOWER CHEST: Unremarkable. LIVER: Normal in size and configuration. No suspicious mass. Limited evaluation of hepatic and portal veins on this non-contrast MRI is unremarkable. BILE DUCTS: No " intrahepatic or extrahepatic bile duct dilation. Common bile duct is normal in caliber. No choledocholithiasis, biliary stricture or suspicious mass. GALLBLADDER: Nonspecific gallbladder wall edema is again seen without cholelithiasis noted. PANCREAS: Unremarkable. ADRENAL GLANDS: Unremarkable. SPLEEN: Normal. KIDNEYS/PROXIMAL URETERS: No hydroureteronephrosis. No suspicious renal mass. BOWEL: No dilated loops of bowel. PERITONEUM/RETROPERITONEUM: No ascites. LYMPH NODES: No abdominal lymphadenopathy. VESSELS: No aneurysm. ABDOMINAL WALL: Unremarkable BONES: No suspicious osseous lesion.     Impression: Nonspecific gallbladder wall edema again identified without evidence cholelithiasis. Normal biliary tree. Workstation performed: [id number]     US right upper quadrant    Result Date: 8/11/2024  Narrative: RIGHT UPPER QUADRANT ULTRASOUND INDICATION: concern for cholecystitis. COMPARISON: CT from yesterday. TECHNIQUE: Real-time ultrasound of the right upper quadrant was performed with a curvilinear transducer with both volumetric sweeps and still imaging techniques. FINDINGS: PANCREAS: Visualized portions of the pancreas are within normal limits. AORTA AND IVC: Visualized portions are normal for patient age. LIVER: Size: Within normal range. The liver measures 16.5 cm in the midclavicular line. Contour: Surface contour is smooth. Parenchyma: Echogenicity and echotexture are within normal limits. Mild periportal edema is again seen No liver mass identified. Limited imaging of the main portal vein shows it to be patent and hepatopetal. BILIARY: No gallstones or sludge. Gallbladder is not significantly distended. There is nonspecific gallbladder wall thickening and pericholecystic edema as identified on CT. No intrahepatic biliary dilatation. CBD measures 3.0 mm. No choledocholithiasis. KIDNEY: Right kidney measures 10.6 x 5.2 x 5.3 cm. Volume 152.2 mL Kidney within normal limits. ASCITES: None.     Impression:  Nonspecific gallbladder wall thickening and pericholecystic edema without gallstones, sludge or significant gallbladder distention. Given periportal edema, consider hepatitis or other etiologies over acalculus cholecystitis. Correlate with LFTs. Biliary scintigraphy can be obtained for further evaluation if there is continued clinical concern for cholecystitis. Workstation performed: [id number]     CT abdomen pelvis w contrast    Result Date: 8/11/2024  Narrative: CT ABDOMEN AND PELVIS WITH IV CONTRAST INDICATION: RLQ pain, nausea, vomiting. COMPARISON: None. TECHNIQUE: CT examination of the abdomen and pelvis was performed. Multiplanar 2D reformatted images were created from the source data. This examination, like all CT scans performed in the [facility], was performed utilizing techniques to minimize radiation dose exposure, including the use of iterative reconstruction and automated exposure control. Radiation dose length product (DLP) for this visit: 297.88 mGy-cm IV Contrast: 100 mL of iohexol (OMNIPAQUE) Enteric Contrast: Not administered. FINDINGS: ABDOMEN LOWER CHEST: No clinically significant abnormality in the visualized lower chest. LIVER/BILIARY TREE: There is mild nonspecific periportal edema. No suspicious hepatic lesions. GALLBLADDER: No calcified gallstones. No pericholecystic inflammatory change. Mild diffuse gallbladder wall thickening is present. SPLEEN: Unremarkable. PANCREAS: Unremarkable. ADRENAL GLANDS: Unremarkable. KIDNEYS/URETERS: Unremarkable. No hydronephrosis. STOMACH AND BOWEL: Unremarkable. APPENDIX: The appendix is not clearly visualized on this study. ABDOMINOPELVIC CAVITY: No ascites. No pneumoperitoneum. No lymphadenopathy. VESSELS: Unremarkable for patient's age. PELVIS REPRODUCTIVE ORGANS: An IUD is noted. URINARY BLADDER: Unremarkable. ABDOMINAL WALL/INGUINAL REGIONS: Unremarkable. BONES: No acute fracture or suspicious osseous lesion.     Impression: No acute  findings in the abdomen or pelvis. Mild nonspecific periportal edema as well as mild diffuse gallbladder wall thickening with no calcified gallstones. Correlation with laboratory studies is recommended. The appendix is not clearly visualized on this study. If symptoms persist, a repeat CT abdomen/pelvis after the administration of oral contrast could be performed for further evaluation. Workstation performed: [id number]         REVIEW OF SYSTEMS:    CONSTITUTIONAL: Denies any fever, chills, rigors, and weight loss.  HEENT: No earache or tinnitus. Denies hearing loss or visual disturbances.  CARDIOVASCULAR: No chest pain or palpitations.   RESPIRATORY: Denies any cough, hemoptysis, shortness of breath or dyspnea on exertion.  GASTROINTESTINAL: As noted in the History of Present Illness.   GENITOURINARY: No problems with urination. Denies any hematuria or dysuria.  NEUROLOGIC: No dizziness or vertigo, denies headaches.   MUSCULOSKELETAL: Denies any muscle or joint pain.   SKIN: Denies skin rashes or itching.   ENDOCRINE: Denies excessive thirst. Denies intolerance to heat or cold.  PSYCHOSOCIAL: Denies depression or anxiety. Denies any recent memory loss.

## 2024-09-25 ENCOUNTER — APPOINTMENT (OUTPATIENT)
Dept: LAB | Facility: CLINIC | Age: 24
End: 2024-09-25
Payer: COMMERCIAL

## 2024-09-25 ENCOUNTER — TELEPHONE (OUTPATIENT)
Age: 24
End: 2024-09-25

## 2024-09-25 DIAGNOSIS — R79.89 ELEVATED LFTS: ICD-10-CM

## 2024-09-25 LAB
ALBUMIN SERPL BCG-MCNC: 4.5 G/DL (ref 3.5–5)
ALP SERPL-CCNC: 63 U/L (ref 34–104)
ALT SERPL W P-5'-P-CCNC: 18 U/L (ref 7–52)
ANION GAP SERPL CALCULATED.3IONS-SCNC: 10 MMOL/L (ref 4–13)
AST SERPL W P-5'-P-CCNC: 26 U/L (ref 13–39)
BILIRUB SERPL-MCNC: 0.5 MG/DL (ref 0.2–1)
BUN SERPL-MCNC: 15 MG/DL (ref 5–25)
CALCIUM SERPL-MCNC: 9.6 MG/DL (ref 8.4–10.2)
CHLORIDE SERPL-SCNC: 102 MMOL/L (ref 96–108)
CO2 SERPL-SCNC: 26 MMOL/L (ref 21–32)
CREAT SERPL-MCNC: 0.93 MG/DL (ref 0.6–1.3)
GFR SERPL CREATININE-BSD FRML MDRD: 88 ML/MIN/1.73SQ M
GLUCOSE P FAST SERPL-MCNC: 79 MG/DL (ref 65–99)
POTASSIUM SERPL-SCNC: 4.1 MMOL/L (ref 3.5–5.3)
PROT SERPL-MCNC: 6.6 G/DL (ref 6.4–8.4)
SODIUM SERPL-SCNC: 138 MMOL/L (ref 135–147)

## 2024-09-25 PROCEDURE — 80053 COMPREHEN METABOLIC PANEL: CPT

## 2024-09-25 PROCEDURE — 36415 COLL VENOUS BLD VENIPUNCTURE: CPT

## 2024-09-25 NOTE — TELEPHONE ENCOUNTER
Pt. Calling with questions regarding follow up, advised based on upcoming HIDA scan results will determine if she needs to follow back u with GI or be referred to general surgery for possible cholecystectomy, pt. Verbalized understanding

## 2024-09-30 ENCOUNTER — HOSPITAL ENCOUNTER (OUTPATIENT)
Dept: RADIOLOGY | Facility: HOSPITAL | Age: 24
Discharge: HOME | End: 2024-09-30
Payer: COMMERCIAL

## 2024-09-30 DIAGNOSIS — R79.89 ELEVATED LFTS: ICD-10-CM

## 2024-09-30 DIAGNOSIS — K80.50 BILIARY COLIC: ICD-10-CM

## 2024-09-30 PROCEDURE — A9537 TC99M MEBROFENIN: HCPCS

## 2024-09-30 PROCEDURE — 78227 HEPATOBIL SYST IMAGE W/DRUG: CPT

## 2024-09-30 RX ORDER — WATER 10 ML/10ML
INJECTION INTRAMUSCULAR; INTRAVENOUS; SUBCUTANEOUS
Status: COMPLETED
Start: 2024-09-30 | End: 2024-09-30

## 2024-09-30 RX ORDER — MORPHINE SULFATE 10 MG/ML
INJECTION, SOLUTION INTRAMUSCULAR; INTRAVENOUS EVERY 4 HOURS PRN
Status: CANCELLED | OUTPATIENT
Start: 2024-09-30

## 2024-09-30 RX ORDER — SINCALIDE 5 UG/5ML
0.02 INJECTION, POWDER, LYOPHILIZED, FOR SOLUTION INTRAVENOUS
Status: DISCONTINUED | OUTPATIENT
Start: 2024-09-30 | End: 2024-10-01 | Stop reason: HOSPADM

## 2024-09-30 RX ORDER — SINCALIDE 5 UG/5ML
INJECTION, POWDER, LYOPHILIZED, FOR SOLUTION INTRAVENOUS
Status: COMPLETED
Start: 2024-09-30 | End: 2024-09-30

## 2024-09-30 RX ADMIN — SINCALIDE 1.2 MCG: 5 INJECTION, POWDER, LYOPHILIZED, FOR SOLUTION INTRAVENOUS at 08:45

## 2024-09-30 RX ADMIN — WATER 5 ML: 1 INJECTION INTRAMUSCULAR; INTRAVENOUS; SUBCUTANEOUS at 08:45

## 2024-10-31 ENCOUNTER — OFFICE VISIT (OUTPATIENT)
Dept: GASTROENTEROLOGY | Facility: CLINIC | Age: 24
End: 2024-10-31
Payer: COMMERCIAL

## 2024-10-31 VITALS
DIASTOLIC BLOOD PRESSURE: 70 MMHG | SYSTOLIC BLOOD PRESSURE: 116 MMHG | HEIGHT: 67 IN | WEIGHT: 136 LBS | BODY MASS INDEX: 21.35 KG/M2

## 2024-10-31 DIAGNOSIS — R79.89 ELEVATED LFTS: ICD-10-CM

## 2024-10-31 DIAGNOSIS — R10.13 DYSPEPSIA: ICD-10-CM

## 2024-10-31 DIAGNOSIS — K80.50 BILIARY COLIC: Primary | ICD-10-CM

## 2024-10-31 PROCEDURE — 99214 OFFICE O/P EST MOD 30 MIN: CPT | Performed by: INTERNAL MEDICINE

## 2024-10-31 NOTE — PROGRESS NOTES
Ambulatory Visit  Name: Jennifer Henry      : 2000      MRN: [id number]  Encounter Provider: America Brasher DO  Encounter Date: 10/31/2024   Encounter department: [facility] [place]    Assessment & Plan  Biliary colic  -I suspect what happened to her was passing a gallstone however no gallstones seen on imaging but her symptomatology seems quite suggestive.  However now she does have some chronic GI symptoms so we will plan EGD with stomach and small bowel biopsies as well as endoscopic ultrasound to assess for microlithiasis by my advanced endoscopy colleague.  Appreciate their opinion.  Will hold off on referral to surgery pending these results.  Patient and her mom agreeable to this plan.  Orders:    EGD; Future    Endoscopic ultrasonography, GI (Upper) Radial; Future    Elevated LFTs  Fortunately these have normalized completely  Orders:    EGD; Future    Endoscopic ultrasonography, GI (Upper) Radial; Future    Dyspepsia         Follow-up in a few months time  History of Present Illness     Jennifer Henry is a 23 y.o. female who presents for hospital follow-up.  She goes by Lisa.  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  She is accompanied by her mother.    She was admitted in August due to abdominal pain that started on her travel home from a soccer game where she ate spaghetti and meatballs which is not usually a part of her diet.    She was admitted as the pain did not go away.  She had elevated transaminases and her symptoms quickly improved within 48 hours.  He had a CT scan, ultrasound, MRI/MRCP, and a follow-up HIDA scan.    Since discharge she complains of a chronic abdominal pain which kind of comes and goes also with some nausea and increased burping.  She has never had issues before.  The pain that she felt has not returned.    She is concerned as she is not sure how to proceed but does  "not want this to happen again.      History obtained from : patient and patient's mother  Review of Systems   Constitutional: Negative.    HENT: Negative.     Eyes: Negative.    Respiratory: Negative.     Gastrointestinal:  Positive for abdominal distention and nausea.   Genitourinary: Negative.    Musculoskeletal: Negative.            Objective     /70 (BP Location: Left arm, Patient Position: Sitting, Cuff Size: Standard)   Ht 5' 7\" (1.702 m)   Wt 61.7 kg (136 lb)   BMI 21.30 kg/m²     Physical Exam  Constitutional:       Appearance: Normal appearance. She is normal weight.   HENT:      Head: Normocephalic and atraumatic.   Cardiovascular:      Rate and Rhythm: Normal rate and regular rhythm.   Pulmonary:      Effort: Pulmonary effort is normal.      Breath sounds: Normal breath sounds.   Abdominal:      General: Abdomen is flat. Bowel sounds are normal.      Palpations: Abdomen is soft.   Neurological:      Mental Status: She is alert.     Reviewed CT, MRI, US, and hida- pt also seen by me in recent hospitalization    "

## 2024-10-31 NOTE — PATIENT INSTRUCTIONS
Scheduled date of EGD(as of today): 12/10/24  Physician performing EGD:Dr. Brasher  Location of EGD: [facility]  Instructions reviewed with patient by: Delfina  Clearances: none    Sent a message to the advanced team to schedule EUS with Cris Orozco

## 2024-12-09 ENCOUNTER — ANESTHESIA EVENT (OUTPATIENT)
Dept: GASTROENTEROLOGY | Facility: HOSPITAL | Age: 24
End: 2024-12-09
Payer: COMMERCIAL

## 2024-12-09 RX ORDER — SODIUM CHLORIDE 9 MG/ML
125 INJECTION, SOLUTION INTRAVENOUS CONTINUOUS
Status: CANCELLED | OUTPATIENT
Start: 2024-12-09

## 2024-12-10 ENCOUNTER — TELEPHONE (OUTPATIENT)
Age: 24
End: 2024-12-10

## 2024-12-10 ENCOUNTER — ANESTHESIA (OUTPATIENT)
Dept: GASTROENTEROLOGY | Facility: HOSPITAL | Age: 24
End: 2024-12-10
Payer: COMMERCIAL

## 2024-12-10 ENCOUNTER — HOSPITAL ENCOUNTER (OUTPATIENT)
Dept: GASTROENTEROLOGY | Facility: HOSPITAL | Age: 24
Setting detail: OUTPATIENT SURGERY
Discharge: HOME | End: 2024-12-10
Attending: INTERNAL MEDICINE
Payer: COMMERCIAL

## 2024-12-10 VITALS
HEIGHT: 67 IN | HEART RATE: 57 BPM | TEMPERATURE: 98.1 F | DIASTOLIC BLOOD PRESSURE: 69 MMHG | RESPIRATION RATE: 16 BRPM | WEIGHT: 136 LBS | OXYGEN SATURATION: 99 % | SYSTOLIC BLOOD PRESSURE: 109 MMHG | BODY MASS INDEX: 21.35 KG/M2

## 2024-12-10 DIAGNOSIS — R10.13 DYSPEPSIA: ICD-10-CM

## 2024-12-10 DIAGNOSIS — R68.81 EARLY SATIETY: ICD-10-CM

## 2024-12-10 DIAGNOSIS — R10.10 PAIN OF UPPER ABDOMEN: Primary | ICD-10-CM

## 2024-12-10 DIAGNOSIS — R79.89 ELEVATED LFTS: ICD-10-CM

## 2024-12-10 DIAGNOSIS — K80.50 BILIARY COLIC: ICD-10-CM

## 2024-12-10 LAB
EXT PREGNANCY TEST URINE: NEGATIVE
EXT. CONTROL: NORMAL

## 2024-12-10 PROCEDURE — 81025 URINE PREGNANCY TEST: CPT | Performed by: ANESTHESIOLOGY

## 2024-12-10 PROCEDURE — 43235 EGD DIAGNOSTIC BRUSH WASH: CPT | Performed by: INTERNAL MEDICINE

## 2024-12-10 RX ORDER — LIDOCAINE HCL/PF 100 MG/5ML
SYRINGE (ML) INJECTION AS NEEDED
Status: DISCONTINUED | OUTPATIENT
Start: 2024-12-10 | End: 2024-12-10

## 2024-12-10 RX ORDER — PROPOFOL 10 MG/ML
INJECTION, EMULSION INTRAVENOUS AS NEEDED
Status: DISCONTINUED | OUTPATIENT
Start: 2024-12-10 | End: 2024-12-10

## 2024-12-10 RX ORDER — FENTANYL CITRATE 50 UG/ML
INJECTION, SOLUTION INTRAMUSCULAR; INTRAVENOUS AS NEEDED
Status: DISCONTINUED | OUTPATIENT
Start: 2024-12-10 | End: 2024-12-10

## 2024-12-10 RX ORDER — SODIUM CHLORIDE, SODIUM LACTATE, POTASSIUM CHLORIDE, CALCIUM CHLORIDE 600; 310; 30; 20 MG/100ML; MG/100ML; MG/100ML; MG/100ML
INJECTION, SOLUTION INTRAVENOUS CONTINUOUS PRN
Status: DISCONTINUED | OUTPATIENT
Start: 2024-12-10 | End: 2024-12-10

## 2024-12-10 RX ORDER — SODIUM CHLORIDE 9 MG/ML
125 INJECTION, SOLUTION INTRAVENOUS CONTINUOUS
Status: DISCONTINUED | OUTPATIENT
Start: 2024-12-10 | End: 2024-12-14 | Stop reason: HOSPADM

## 2024-12-10 RX ADMIN — PROPOFOL 150 MG: 10 INJECTION, EMULSION INTRAVENOUS at 07:40

## 2024-12-10 RX ADMIN — FENTANYL CITRATE 25 MCG: 50 INJECTION INTRAMUSCULAR; INTRAVENOUS at 07:40

## 2024-12-10 RX ADMIN — SODIUM CHLORIDE, SODIUM LACTATE, POTASSIUM CHLORIDE, AND CALCIUM CHLORIDE: .6; .31; .03; .02 INJECTION, SOLUTION INTRAVENOUS at 07:25

## 2024-12-10 RX ADMIN — FENTANYL CITRATE 25 MCG: 50 INJECTION INTRAMUSCULAR; INTRAVENOUS at 07:34

## 2024-12-10 RX ADMIN — LIDOCAINE HYDROCHLORIDE 100 MG: 20 INJECTION INTRAVENOUS at 07:40

## 2024-12-10 RX ADMIN — SODIUM CHLORIDE 125 ML/HR: 0.9 INJECTION, SOLUTION INTRAVENOUS at 07:22

## 2024-12-10 NOTE — TELEPHONE ENCOUNTER
Patients GI provider:  Dr. Brasher    Number to return call: [phone]    Reason for call: Pt calling to request call back from Dr. Brasher to discuss next steps. Was supposed to have an EUS this morning but was unable to complete due to undigested food. Pt has been sched for a gastric emptying study on 12/17. Please have doc review and call pt back to discuss next steps.    Scheduled procedure/appointment date if applicable: procedure 12/17/2024

## 2024-12-10 NOTE — ANESTHESIA PREPROCEDURE EVALUATION
Procedure:  ENDOSCOPIC ULTRASOUND (UPPER)    Relevant Problems   Surgery/Wound/Pain   (+) Abdominal pain        Physical Exam    Airway    Mallampati score: II         Dental   No notable dental hx     Cardiovascular  Cardiovascular exam normal    Pulmonary  Pulmonary exam normal     Other Findings  post-pubertal.      Anesthesia Plan  ASA Score- 1     Anesthesia Type- IV sedation with anesthesia with ASA Monitors.         Additional Monitors:     Airway Plan:            Plan Factors-Exercise tolerance (METS): >4 METS.    Chart reviewed.        Patient is not a current smoker.      Obstructive sleep apnea risk education given perioperatively.        Induction- intravenous.    Postoperative Plan-         Informed Consent- Anesthetic plan and risks discussed with patient.

## 2024-12-10 NOTE — ANESTHESIA POSTPROCEDURE EVALUATION
Post-Op Assessment Note    CV Status:  Stable  Pain Score: 0    Pain management: adequate       Mental Status:  Alert and awake   Hydration Status:  Euvolemic   PONV Controlled:  Controlled   Airway Patency:  Patent     Post Op Vitals Reviewed: Yes    No anethesia notable event occurred.    Staff: CRNA, Anesthesiologist           Last Filed PACU Vitals:  Vitals Value Taken Time   Temp 97    Pulse 80    BP 99/55    Resp 16    SpO2 99        Modified Sena:  Activity: 2 (12/10/2024  7:50 AM)  Respiration: 2 (12/10/2024  7:50 AM)  Circulation: 2 (12/10/2024  7:50 AM)  Consciousness: 2 (12/10/2024  7:50 AM)  Oxygen Saturation: 2 (12/10/2024  7:50 AM)  Modified Sena Score: 10 (12/10/2024  7:50 AM)

## 2024-12-10 NOTE — H&P
History and Physical - [facility]  Jennifer Henry 24 y.o. female MRN: [id number]                  HPI: Jennifer Henry is a 24 y.o. year old female who presents for biliary colic, dyspepsia.       REVIEW OF SYSTEMS: Per the HPI, and otherwise unremarkable.    Historical Information   No past medical history on file.  Past Surgical History:   Procedure Laterality Date    WISDOM TOOTH EXTRACTION       Social History   Social History     Substance and Sexual Activity   Alcohol Use Never     Social History     Substance and Sexual Activity   Drug Use Never     Social History     Tobacco Use   Smoking Status Never   Smokeless Tobacco Never     Family History   Problem Relation Age of Onset    Colon cancer Neg Hx     Colon polyps Neg Hx        Meds/Allergies       Current Outpatient Medications:     spironolactone (ALDACTONE) 25 mg tablet    Current Facility-Administered Medications:     sodium chloride 0.9 % infusion, 125 mL/hr, Intravenous, Continuous    No Known Allergies    Objective     There were no vitals taken for this visit.      PHYSICAL EXAM    Gen: NAD  Head: NCAT  CV: RRR  CHEST: Clear  ABD: soft, NT/ND  EXT: no edema      ASSESSMENT/PLAN:  This is a 24 y.o. year old female here for EGD/EUS, and she is stable and optimized for her procedure.

## 2024-12-10 NOTE — ANESTHESIA POSTPROCEDURE EVALUATION
Post-Op Assessment Note    CV Status:  Stable    Pain management: adequate       Mental Status:  Alert and awake   Hydration Status:  Euvolemic   PONV Controlled:  Controlled   Airway Patency:  Patent     Post Op Vitals Reviewed: Yes    No anethesia notable event occurred.    Staff: Anesthesiologist           Last Filed PACU Vitals:  Vitals Value Taken Time   Temp 98.1 °F (36.7 °C) 12/10/24 0759   Pulse 57 12/10/24 0759   /69 12/10/24 0759   Resp 16 12/10/24 0759   SpO2 99 % 12/10/24 0759       Modified Sena:  Activity: 2 (12/10/2024  8:11 AM)  Respiration: 2 (12/10/2024  8:11 AM)  Circulation: 2 (12/10/2024  8:11 AM)  Consciousness: 2 (12/10/2024  8:11 AM)  Oxygen Saturation: 2 (12/10/2024  8:11 AM)  Modified Sena Score: 10 (12/10/2024  8:11 AM)

## 2024-12-17 ENCOUNTER — HOSPITAL ENCOUNTER (OUTPATIENT)
Dept: NON INVASIVE DIAGNOSTICS | Facility: CLINIC | Age: 24
Discharge: HOME | End: 2024-12-17
Payer: COMMERCIAL

## 2024-12-17 ENCOUNTER — RESULTS FOLLOW-UP (OUTPATIENT)
Dept: GASTROENTEROLOGY | Facility: CLINIC | Age: 24
End: 2024-12-17

## 2024-12-17 DIAGNOSIS — R68.81 EARLY SATIETY: ICD-10-CM

## 2024-12-17 DIAGNOSIS — R10.13 DYSPEPSIA: ICD-10-CM

## 2024-12-17 DIAGNOSIS — R10.10 PAIN OF UPPER ABDOMEN: ICD-10-CM

## 2024-12-17 PROCEDURE — 78264 GASTRIC EMPTYING IMG STUDY: CPT

## 2024-12-17 PROCEDURE — A9541 TC99M SULFUR COLLOID: HCPCS

## 2024-12-17 NOTE — RESULT ENCOUNTER NOTE
Staff, inform patient that gastric emptying was normal. She can follow up in the office to discuss her symptoms and need for repeat procedure.

## 2024-12-19 NOTE — TELEPHONE ENCOUNTER
I left a voice message to inform patient of results and provided the office number with any questions or concerns, thank you

## 2024-12-19 NOTE — TELEPHONE ENCOUNTER
----- Message from Salomon Ibarra DO sent at 12/17/2024 12:37 PM EST -----  Staff, inform patient that gastric emptying was normal. She can follow up in the office to discuss her symptoms and need for repeat procedure.

## 2025-02-12 ENCOUNTER — OFFICE VISIT (OUTPATIENT)
Dept: GASTROENTEROLOGY | Facility: CLINIC | Age: 25
End: 2025-02-12
Payer: COMMERCIAL

## 2025-02-12 VITALS
DIASTOLIC BLOOD PRESSURE: 64 MMHG | BODY MASS INDEX: 21.03 KG/M2 | HEART RATE: 76 BPM | WEIGHT: 134 LBS | SYSTOLIC BLOOD PRESSURE: 100 MMHG | HEIGHT: 67 IN

## 2025-02-12 DIAGNOSIS — R79.89 ELEVATED LFTS: Primary | ICD-10-CM

## 2025-02-12 DIAGNOSIS — Z11.59 NEED FOR HEPATITIS C SCREENING TEST: ICD-10-CM

## 2025-02-12 PROCEDURE — 99214 OFFICE O/P EST MOD 30 MIN: CPT | Performed by: INTERNAL MEDICINE

## 2025-02-12 RX ORDER — LEVONORGESTREL 19.5 MG/1
INTRAUTERINE DEVICE INTRAUTERINE
COMMUNITY

## 2025-02-12 RX ORDER — TRETINOIN 0.25 MG/G
CREAM TOPICAL
COMMUNITY

## 2025-02-12 RX ORDER — OMEGA-3S/DHA/EPA/FISH OIL/D3 300MG-1000
400 CAPSULE ORAL DAILY
COMMUNITY

## 2025-02-12 RX ORDER — NICOTINE 14MG/24HR
PATCH, TRANSDERMAL 24 HOURS TRANSDERMAL
COMMUNITY

## 2025-02-16 ENCOUNTER — APPOINTMENT (OUTPATIENT)
Dept: LAB | Facility: CLINIC | Age: 25
End: 2025-02-16
Payer: COMMERCIAL

## 2025-02-16 ENCOUNTER — RESULTS FOLLOW-UP (OUTPATIENT)
Dept: GASTROENTEROLOGY | Facility: CLINIC | Age: 25
End: 2025-02-16

## 2025-02-16 DIAGNOSIS — R79.89 ELEVATED LFTS: ICD-10-CM

## 2025-02-16 LAB
ALBUMIN SERPL BCG-MCNC: 4.4 G/DL (ref 3.5–5)
ALP SERPL-CCNC: 62 U/L (ref 34–104)
ALT SERPL W P-5'-P-CCNC: 14 U/L (ref 7–52)
ANION GAP SERPL CALCULATED.3IONS-SCNC: 7 MMOL/L (ref 4–13)
AST SERPL W P-5'-P-CCNC: 17 U/L (ref 13–39)
BILIRUB SERPL-MCNC: 0.53 MG/DL (ref 0.2–1)
BUN SERPL-MCNC: 15 MG/DL (ref 5–25)
CALCIUM SERPL-MCNC: 10 MG/DL (ref 8.4–10.2)
CHLORIDE SERPL-SCNC: 105 MMOL/L (ref 96–108)
CO2 SERPL-SCNC: 28 MMOL/L (ref 21–32)
CREAT SERPL-MCNC: 0.9 MG/DL (ref 0.6–1.3)
GFR SERPL CREATININE-BSD FRML MDRD: 91 ML/MIN/1.73SQ M
GLUCOSE P FAST SERPL-MCNC: 84 MG/DL (ref 65–99)
IGA SERPL-MCNC: 69 MG/DL (ref 66–433)
POTASSIUM SERPL-SCNC: 4.3 MMOL/L (ref 3.5–5.3)
PROT SERPL-MCNC: 6.8 G/DL (ref 6.4–8.4)
SODIUM SERPL-SCNC: 140 MMOL/L (ref 135–147)

## 2025-02-16 PROCEDURE — 82784 ASSAY IGA/IGD/IGG/IGM EACH: CPT

## 2025-02-16 PROCEDURE — 86364 TISS TRNSGLTMNASE EA IG CLAS: CPT

## 2025-02-16 PROCEDURE — 80053 COMPREHEN METABOLIC PANEL: CPT

## 2025-02-16 PROCEDURE — 36415 COLL VENOUS BLD VENIPUNCTURE: CPT

## 2025-02-22 LAB — TTG IGA SER IA-ACNC: 0.4 U/ML (ref ?–10)

## 2025-02-24 NOTE — PROGRESS NOTES
"Name: Jennifer Henry      : 2000      MRN: [id number]  Encounter Provider: America Brasher DO  Encounter Date: 2025   Encounter department: [facility] [place]  :  Assessment & Plan  Elevated LFTs  She had an episode of presumed passing a small stone with acute abdominal pain but no stones found on imaging.  I sent for eus to evaluate but unfortunately had food in the stomach.  A subsequent ges was normal.  She has not had any further symptoms.  Symptoms occurred after eating greasy spaghetti at a restaurant.    Will repeat cmp and also check for celiac disease given mildly elevated lfts still.  Follow up annually.  Orders:  •  Comprehensive metabolic panel; Future  •  Celiac Disease Comprehensive Panel; Future    Need for hepatitis C screening test  negative           History of Present Illness   HPI  Jennifer Henry is a 24 y.o. female who presents for follow up.  Symptoms havent' recurred.  She is applying to PT school and wants to move away.      History obtained from: patient and and pt's mom    Review of Systems       Objective   /64   Pulse 76   Ht 5' 7\" (1.702 m)   Wt 60.8 kg (134 lb)   BMI 20.99 kg/m²      Physical Exam  Constitutional:       Appearance: Normal appearance. She is normal weight.   Cardiovascular:      Rate and Rhythm: Normal rate and regular rhythm.      Heart sounds: Normal heart sounds.   Pulmonary:      Effort: Pulmonary effort is normal.      Breath sounds: Normal breath sounds.   Abdominal:      General: Abdomen is flat. Bowel sounds are normal.      Palpations: Abdomen is soft.   Neurological:      Mental Status: She is alert.           "

## 2025-08-08 ENCOUNTER — HOSPITAL ENCOUNTER (OUTPATIENT)
Dept: RADIOLOGY | Age: 25
Discharge: HOME | End: 2025-08-08
Attending: NURSE PRACTITIONER
Payer: COMMERCIAL

## 2025-08-08 DIAGNOSIS — Z97.5 PRESENCE OF INTRAUTERINE CONTRACEPTIVE DEVICE: ICD-10-CM

## 2025-08-08 PROCEDURE — 76830 TRANSVAGINAL US NON-OB: CPT
